# Patient Record
Sex: MALE | Race: ASIAN | NOT HISPANIC OR LATINO
[De-identification: names, ages, dates, MRNs, and addresses within clinical notes are randomized per-mention and may not be internally consistent; named-entity substitution may affect disease eponyms.]

---

## 2017-02-22 ENCOUNTER — APPOINTMENT (OUTPATIENT)
Dept: RADIOLOGY | Facility: IMAGING CENTER | Age: 72
End: 2017-02-22

## 2017-02-22 ENCOUNTER — OUTPATIENT (OUTPATIENT)
Dept: OUTPATIENT SERVICES | Facility: HOSPITAL | Age: 72
LOS: 1 days | End: 2017-02-22
Payer: MEDICARE

## 2017-02-22 DIAGNOSIS — M25.561 PAIN IN RIGHT KNEE: ICD-10-CM

## 2017-02-22 PROCEDURE — 73564 X-RAY EXAM KNEE 4 OR MORE: CPT

## 2018-05-17 ENCOUNTER — FORM ENCOUNTER (OUTPATIENT)
Age: 73
End: 2018-05-17

## 2018-05-18 ENCOUNTER — APPOINTMENT (OUTPATIENT)
Dept: ORTHOPEDIC SURGERY | Facility: CLINIC | Age: 73
End: 2018-05-18
Payer: MEDICARE

## 2018-05-18 ENCOUNTER — APPOINTMENT (OUTPATIENT)
Dept: MRI IMAGING | Facility: CLINIC | Age: 73
End: 2018-05-18
Payer: MEDICARE

## 2018-05-18 ENCOUNTER — OUTPATIENT (OUTPATIENT)
Dept: OUTPATIENT SERVICES | Facility: HOSPITAL | Age: 73
LOS: 1 days | End: 2018-05-18
Payer: MEDICARE

## 2018-05-18 VITALS — SYSTOLIC BLOOD PRESSURE: 156 MMHG | DIASTOLIC BLOOD PRESSURE: 70 MMHG | HEART RATE: 69 BPM

## 2018-05-18 VITALS — WEIGHT: 130 LBS | BODY MASS INDEX: 22.2 KG/M2 | HEIGHT: 64 IN

## 2018-05-18 DIAGNOSIS — M48.061 SPINAL STENOSIS, LUMBAR REGION WITHOUT NEUROGENIC CLAUDICATION: ICD-10-CM

## 2018-05-18 DIAGNOSIS — M48.02 SPINAL STENOSIS, CERVICAL REGION: ICD-10-CM

## 2018-05-18 DIAGNOSIS — M54.16 RADICULOPATHY, LUMBAR REGION: ICD-10-CM

## 2018-05-18 DIAGNOSIS — M48.04 SPINAL STENOSIS, THORACIC REGION: ICD-10-CM

## 2018-05-18 PROCEDURE — 72141 MRI NECK SPINE W/O DYE: CPT | Mod: 26

## 2018-05-18 PROCEDURE — 72146 MRI CHEST SPINE W/O DYE: CPT

## 2018-05-18 PROCEDURE — 72110 X-RAY EXAM L-2 SPINE 4/>VWS: CPT

## 2018-05-18 PROCEDURE — 72148 MRI LUMBAR SPINE W/O DYE: CPT | Mod: 26

## 2018-05-18 PROCEDURE — 72141 MRI NECK SPINE W/O DYE: CPT

## 2018-05-18 PROCEDURE — 72146 MRI CHEST SPINE W/O DYE: CPT | Mod: 26

## 2018-05-18 PROCEDURE — 72148 MRI LUMBAR SPINE W/O DYE: CPT

## 2018-05-18 PROCEDURE — 99204 OFFICE O/P NEW MOD 45 MIN: CPT

## 2018-05-22 RX ORDER — ALBUTEROL SULFATE 108 UG/1
108 (90 BASE) AEROSOL, METERED RESPIRATORY (INHALATION)
Qty: 20 | Refills: 0 | Status: ACTIVE | COMMUNITY
Start: 2018-04-13

## 2018-05-22 RX ORDER — METFORMIN HYDROCHLORIDE 500 MG/1
500 TABLET, COATED ORAL
Qty: 180 | Refills: 0 | Status: DISCONTINUED | COMMUNITY
Start: 2018-04-13

## 2018-05-22 RX ORDER — ISOPROPYL ALCOHOL 0.75 G/1
SWAB TOPICAL
Qty: 100 | Refills: 0 | Status: DISCONTINUED | COMMUNITY
Start: 2017-09-15

## 2018-05-22 RX ORDER — ATORVASTATIN CALCIUM 20 MG/1
20 TABLET, FILM COATED ORAL
Qty: 90 | Refills: 0 | Status: DISCONTINUED | COMMUNITY
Start: 2018-04-13

## 2018-05-30 ENCOUNTER — APPOINTMENT (OUTPATIENT)
Dept: ORTHOPEDIC SURGERY | Facility: CLINIC | Age: 73
End: 2018-05-30
Payer: MEDICARE

## 2018-05-30 PROCEDURE — 99214 OFFICE O/P EST MOD 30 MIN: CPT

## 2018-05-30 PROCEDURE — 72082 X-RAY EXAM ENTIRE SPI 2/3 VW: CPT

## 2018-06-01 ENCOUNTER — APPOINTMENT (OUTPATIENT)
Dept: SPINE | Facility: CLINIC | Age: 73
End: 2018-06-01
Payer: MEDICARE

## 2018-06-01 VITALS
BODY MASS INDEX: 22.2 KG/M2 | DIASTOLIC BLOOD PRESSURE: 72 MMHG | RESPIRATION RATE: 15 BRPM | WEIGHT: 130 LBS | SYSTOLIC BLOOD PRESSURE: 166 MMHG | HEIGHT: 64 IN | HEART RATE: 70 BPM

## 2018-06-01 DIAGNOSIS — N40.1 BENIGN PROSTATIC HYPERPLASIA WITH LOWER URINARY TRACT SYMPMS: ICD-10-CM

## 2018-06-01 DIAGNOSIS — Z86.39 PERSONAL HISTORY OF OTHER ENDOCRINE, NUTRITIONAL AND METABOLIC DISEASE: ICD-10-CM

## 2018-06-01 PROCEDURE — 99204 OFFICE O/P NEW MOD 45 MIN: CPT

## 2018-06-01 RX ORDER — ASPIRIN 325 MG
81 TABLET ORAL
Refills: 0 | Status: COMPLETED | COMMUNITY
End: 2018-06-01

## 2018-06-01 RX ORDER — NAPROXEN 500 MG/1
TABLET ORAL
Refills: 0 | Status: COMPLETED | COMMUNITY
End: 2018-06-01

## 2018-06-01 RX ORDER — FLUTICASONE PROPIONATE 50 UG/1
50 SPRAY, METERED NASAL
Qty: 48 | Refills: 0 | Status: COMPLETED | COMMUNITY
Start: 2017-09-15 | End: 2018-06-01

## 2018-06-01 RX ORDER — ASPIRIN 81 MG
81 TABLET, DELAYED RELEASE (ENTERIC COATED) ORAL
Refills: 0 | Status: ACTIVE | COMMUNITY

## 2018-06-01 RX ORDER — TAMSULOSIN HYDROCHLORIDE 0.4 MG/1
CAPSULE ORAL
Refills: 0 | Status: ACTIVE | COMMUNITY

## 2018-06-01 RX ORDER — TAMSULOSIN HYDROCHLORIDE 0.4 MG/1
0.4 CAPSULE ORAL
Qty: 90 | Refills: 0 | Status: COMPLETED | COMMUNITY
Start: 2018-04-13 | End: 2018-06-01

## 2018-06-08 ENCOUNTER — OUTPATIENT (OUTPATIENT)
Dept: OUTPATIENT SERVICES | Facility: HOSPITAL | Age: 73
LOS: 1 days | End: 2018-06-08
Payer: MEDICARE

## 2018-06-08 VITALS
WEIGHT: 128.97 LBS | SYSTOLIC BLOOD PRESSURE: 150 MMHG | HEART RATE: 75 BPM | HEIGHT: 65 IN | RESPIRATION RATE: 18 BRPM | DIASTOLIC BLOOD PRESSURE: 70 MMHG | OXYGEN SATURATION: 98 % | TEMPERATURE: 98 F

## 2018-06-08 DIAGNOSIS — M48.04 SPINAL STENOSIS, THORACIC REGION: ICD-10-CM

## 2018-06-08 DIAGNOSIS — J44.9 CHRONIC OBSTRUCTIVE PULMONARY DISEASE, UNSPECIFIED: ICD-10-CM

## 2018-06-08 DIAGNOSIS — Z29.9 ENCOUNTER FOR PROPHYLACTIC MEASURES, UNSPECIFIED: ICD-10-CM

## 2018-06-08 DIAGNOSIS — E78.5 HYPERLIPIDEMIA, UNSPECIFIED: ICD-10-CM

## 2018-06-08 DIAGNOSIS — M48.03 SPINAL STENOSIS, CERVICOTHORACIC REGION: ICD-10-CM

## 2018-06-08 DIAGNOSIS — I10 ESSENTIAL (PRIMARY) HYPERTENSION: ICD-10-CM

## 2018-06-08 DIAGNOSIS — Z01.818 ENCOUNTER FOR OTHER PREPROCEDURAL EXAMINATION: ICD-10-CM

## 2018-06-08 DIAGNOSIS — E11.9 TYPE 2 DIABETES MELLITUS WITHOUT COMPLICATIONS: ICD-10-CM

## 2018-06-08 DIAGNOSIS — Z98.890 OTHER SPECIFIED POSTPROCEDURAL STATES: Chronic | ICD-10-CM

## 2018-06-08 LAB
ANION GAP SERPL CALC-SCNC: 14 MMOL/L — SIGNIFICANT CHANGE UP (ref 5–17)
BLD GP AB SCN SERPL QL: NEGATIVE — SIGNIFICANT CHANGE UP
BUN SERPL-MCNC: 18 MG/DL — SIGNIFICANT CHANGE UP (ref 7–23)
CALCIUM SERPL-MCNC: 9.1 MG/DL — SIGNIFICANT CHANGE UP (ref 8.4–10.5)
CHLORIDE SERPL-SCNC: 97 MMOL/L — SIGNIFICANT CHANGE UP (ref 96–108)
CO2 SERPL-SCNC: 24 MMOL/L — SIGNIFICANT CHANGE UP (ref 22–31)
CREAT SERPL-MCNC: 1.03 MG/DL — SIGNIFICANT CHANGE UP (ref 0.5–1.3)
GLUCOSE SERPL-MCNC: 119 MG/DL — HIGH (ref 70–99)
HCT VFR BLD CALC: 37.6 % — LOW (ref 39–50)
HGB BLD-MCNC: 13.1 G/DL — SIGNIFICANT CHANGE UP (ref 13–17)
MCHC RBC-ENTMCNC: 32.8 PG — SIGNIFICANT CHANGE UP (ref 27–34)
MCHC RBC-ENTMCNC: 34.8 GM/DL — SIGNIFICANT CHANGE UP (ref 32–36)
MCV RBC AUTO: 94.2 FL — SIGNIFICANT CHANGE UP (ref 80–100)
PLATELET # BLD AUTO: 234 K/UL — SIGNIFICANT CHANGE UP (ref 150–400)
POTASSIUM SERPL-MCNC: 4.3 MMOL/L — SIGNIFICANT CHANGE UP (ref 3.5–5.3)
POTASSIUM SERPL-SCNC: 4.3 MMOL/L — SIGNIFICANT CHANGE UP (ref 3.5–5.3)
RBC # BLD: 3.99 M/UL — LOW (ref 4.2–5.8)
RBC # FLD: 13.1 % — SIGNIFICANT CHANGE UP (ref 10.3–14.5)
RH IG SCN BLD-IMP: POSITIVE — SIGNIFICANT CHANGE UP
SODIUM SERPL-SCNC: 135 MMOL/L — SIGNIFICANT CHANGE UP (ref 135–145)
WBC # BLD: 8.09 K/UL — SIGNIFICANT CHANGE UP (ref 3.8–10.5)
WBC # FLD AUTO: 8.09 K/UL — SIGNIFICANT CHANGE UP (ref 3.8–10.5)

## 2018-06-08 PROCEDURE — 80048 BASIC METABOLIC PNL TOTAL CA: CPT

## 2018-06-08 PROCEDURE — 86900 BLOOD TYPING SEROLOGIC ABO: CPT

## 2018-06-08 PROCEDURE — 87640 STAPH A DNA AMP PROBE: CPT

## 2018-06-08 PROCEDURE — G0463: CPT

## 2018-06-08 PROCEDURE — 87641 MR-STAPH DNA AMP PROBE: CPT

## 2018-06-08 PROCEDURE — 83036 HEMOGLOBIN GLYCOSYLATED A1C: CPT

## 2018-06-08 PROCEDURE — 85027 COMPLETE CBC AUTOMATED: CPT

## 2018-06-08 PROCEDURE — 86901 BLOOD TYPING SEROLOGIC RH(D): CPT

## 2018-06-08 PROCEDURE — 86850 RBC ANTIBODY SCREEN: CPT

## 2018-06-08 RX ORDER — SODIUM CHLORIDE 9 MG/ML
3 INJECTION INTRAMUSCULAR; INTRAVENOUS; SUBCUTANEOUS EVERY 8 HOURS
Qty: 0 | Refills: 0 | Status: DISCONTINUED | OUTPATIENT
Start: 2018-06-15 | End: 2018-06-15

## 2018-06-08 RX ORDER — CEFAZOLIN SODIUM 1 G
2000 VIAL (EA) INJECTION ONCE
Qty: 0 | Refills: 0 | Status: DISCONTINUED | OUTPATIENT
Start: 2018-06-15 | End: 2018-06-16

## 2018-06-08 RX ORDER — LIDOCAINE HCL 20 MG/ML
0.2 VIAL (ML) INJECTION ONCE
Qty: 0 | Refills: 0 | Status: DISCONTINUED | OUTPATIENT
Start: 2018-06-15 | End: 2018-06-18

## 2018-06-08 NOTE — H&P PST ADULT - PROBLEM SELECTOR PLAN 1
T10-T12 and L4-L5  decompressive Lumbar Laminectomy Insitu Fusion on 6/15/2018  -pre- op instructions discussed  -CBC,BMP,Hga1c,MRSA/MSSA Type and screen sent -Pre- op instructions discussed   -CBC,BMP,Type nd Screen, MRSA/MSSA sent T10-T12 and L4-L5  decompressive Lumbar Laminectomy Insitu Fusion on 6/15/2018.  -Pre- op instructions discussed   -CBC,BMP,Type nd Screen, MRSA/MSSA sent  - pt will stop aspirin for 5 days his own

## 2018-06-08 NOTE — H&P PST ADULT - RS GEN PE MLT RESP DETAILS PC
breath sounds equal/respirations non-labored/clear to auscultation bilaterally/normal/good air movement/airway patent

## 2018-06-08 NOTE — H&P PST ADULT - ASSESSMENT
CAPRINI SCORE [CLOT]    AGE RELATED RISK FACTORS                                                       MOBILITY RELATED FACTORS  [ ] Age 41-60 years                                            (1 Point)                  [ ] Bed rest                                                        (1 Point)  [ ] Age: 61-74 years                                           (2 Points)                 [ ] Plaster cast                                                   (2 Points)  [x ] Age= 75 years                                              (3 Points)                 [ ] Bed bound for more than 72 hours                 (2 Points)    DISEASE RELATED RISK FACTORS                                               GENDER SPECIFIC FACTORS  [ ] Edema in the lower extremities                       (1 Point)                  [ ] Pregnancy                                                     (1 Point)  [ ] Varicose veins                                               (1 Point)                  [ ] Post-partum < 6 weeks                                   (1 Point)             [ ] BMI > 25 Kg/m2                                            (1 Point)                  [ ] Hormonal therapy  or oral contraception          (1 Point)                 [ ] Sepsis (in the previous month)                        (1 Point)                  [ ] History of pregnancy complications                 (1 point)  [ ] Pneumonia or serious lung disease                                               [ ] Unexplained or recurrent                     (1 Point)           (in the previous month)                               (1 Point)  [ ] Abnormal pulmonary function test                     (1 Point)                 SURGERY RELATED RISK FACTORS  [ ] Acute myocardial infarction                              (1 Point)                 [ ]  Section                                             (1 Point)  [ ] Congestive heart failure (in the previous month)  (1 Point)               [ ] Minor surgery                                                  (1 Point)   [ ] Inflammatory bowel disease                             (1 Point)                 [ ] Arthroscopic surgery                                        (2 Points)  [ ] Central venous access                                      (2 Points)                [x ] General surgery lasting more than 45 minutes   (2 Points)       [ ] Stroke (in the previous month)                          (5 Points)               [ ] Elective arthroplasty                                         (5 Points)                                                                                                                                               HEMATOLOGY RELATED FACTORS                                                 TRAUMA RELATED RISK FACTORS  [ ] Prior episodes of VTE                                     (3 Points)                 [ ] Fracture of the hip, pelvis, or leg                       (5 Points)  [ ] Positive family history for VTE                         (3 Points)                 [ ] Acute spinal cord injury (in the previous month)  (5 Points)  [ ] Prothrombin 85707 A                                     (3 Points)                 [ ] Paralysis  (less than 1 month)                             (5 Points)  [ ] Factor V Leiden                                             (3 Points)                  [ ] Multiple Trauma within 1 month                        (5 Points)  [ ] Lupus anticoagulants                                     (3 Points)                                                           [ ] Anticardiolipin antibodies                               (3 Points)                                                       [ ] High homocysteine in the blood                      (3 Points)                                             [ ] Other congenital or acquired thrombophilia      (3 Points)                                                [ ] Heparin induced thrombocytopenia                  (3 Points)                                          Total Score [        6  ]

## 2018-06-08 NOTE — H&P PST ADULT - PMH
Anemia    Chronic back pain    COPD (chronic obstructive pulmonary disease)  last exacerbation 2 years ago - no h/o hospitalization  DM type 2 (diabetes mellitus, type 2)    HLD (hyperlipidemia)    HTN (hypertension)    Spinal stenosis of cervicothoracic region  and Lumbar region

## 2018-06-08 NOTE — H&P PST ADULT - PROBLEM SELECTOR PLAN 6
The Caprini score indicates that this patient is at high risk for a VTE event (score = 6).    Ssurgical patients in this group will benefit from both pharmacologic prophylaxis and intermittent compression devices.    The surgical team will determine the balance between VTE risk and bleeding risk, and other clinical considerations.

## 2018-06-08 NOTE — H&P PST ADULT - HISTORY OF PRESENT ILLNESS
74 y/o male  with PMH of HTN, HLD, DM type 2 , chronic back pain .  Pt has been experiencing  lower back pain , radiating to both legs with numbness and tingling sensation difficulty  walking for few years  also c/o numbness and tinging sensation to UE  followed by neurology, MRI of cervical, thoracic and lumbar  revealed T10-T12 myelomalacia secondary to stenosis , also noted L4-5 stenosis . Pt tried multiple conservative management in the past without any improvement . Presents to PST for scheduled T10-T12 and L4-L5  decompressive Lumbar Laminectomy Insitu Fusion on 6/15/2018.

## 2018-06-08 NOTE — H&P PST ADULT - NSANTHOSAYNRD_GEN_A_CORE
15.25 inch neck/No. CORBIN screening performed.  STOP BANG Legend: 0-2 = LOW Risk; 3-4 = INTERMEDIATE Risk; 5-8 = HIGH Risk criteria/No. CORBIN screening performed.  STOP BANG Legend: 0-2 = LOW Risk; 3-4 = INTERMEDIATE Risk; 5-8 = HIGH Risk

## 2018-06-08 NOTE — H&P PST ADULT - MALLAMPATI CLASS
Class II - visualization of the soft palate, fauces, and uvula/15.25 inch enck Class II - visualization of the soft palate, fauces, and uvula/15.25 inch neck

## 2018-06-09 LAB
HBA1C BLD-MCNC: 5.8 % — HIGH (ref 4–5.6)
MRSA PCR RESULT.: SIGNIFICANT CHANGE UP
S AUREUS DNA NOSE QL NAA+PROBE: SIGNIFICANT CHANGE UP

## 2018-06-14 ENCOUNTER — TRANSCRIPTION ENCOUNTER (OUTPATIENT)
Age: 73
End: 2018-06-14

## 2018-06-15 ENCOUNTER — APPOINTMENT (OUTPATIENT)
Dept: SPINE | Facility: HOSPITAL | Age: 73
End: 2018-06-15

## 2018-06-15 ENCOUNTER — INPATIENT (INPATIENT)
Facility: HOSPITAL | Age: 73
LOS: 2 days | Discharge: HOME | DRG: 519 | End: 2018-06-18
Attending: NEUROLOGICAL SURGERY | Admitting: NEUROLOGICAL SURGERY
Payer: MEDICARE

## 2018-06-15 VITALS
DIASTOLIC BLOOD PRESSURE: 74 MMHG | HEIGHT: 65 IN | WEIGHT: 128.97 LBS | HEART RATE: 66 BPM | TEMPERATURE: 98 F | RESPIRATION RATE: 16 BRPM | OXYGEN SATURATION: 98 % | SYSTOLIC BLOOD PRESSURE: 154 MMHG

## 2018-06-15 DIAGNOSIS — M48.04 SPINAL STENOSIS, THORACIC REGION: ICD-10-CM

## 2018-06-15 DIAGNOSIS — Z98.890 OTHER SPECIFIED POSTPROCEDURAL STATES: Chronic | ICD-10-CM

## 2018-06-15 LAB
ALBUMIN SERPL ELPH-MCNC: 3.6 G/DL — SIGNIFICANT CHANGE UP (ref 3.3–5)
ALP SERPL-CCNC: 36 U/L — LOW (ref 40–120)
ALT FLD-CCNC: 21 U/L — SIGNIFICANT CHANGE UP (ref 10–45)
ANION GAP SERPL CALC-SCNC: 11 MMOL/L — SIGNIFICANT CHANGE UP (ref 5–17)
AST SERPL-CCNC: 19 U/L — SIGNIFICANT CHANGE UP (ref 10–40)
BASOPHILS # BLD AUTO: 0.1 K/UL — SIGNIFICANT CHANGE UP (ref 0–0.2)
BASOPHILS NFR BLD AUTO: 0.8 % — SIGNIFICANT CHANGE UP (ref 0–2)
BILIRUB SERPL-MCNC: 0.2 MG/DL — SIGNIFICANT CHANGE UP (ref 0.2–1.2)
BUN SERPL-MCNC: 15 MG/DL — SIGNIFICANT CHANGE UP (ref 7–23)
CALCIUM SERPL-MCNC: 7.8 MG/DL — LOW (ref 8.4–10.5)
CHLORIDE SERPL-SCNC: 104 MMOL/L — SIGNIFICANT CHANGE UP (ref 96–108)
CO2 SERPL-SCNC: 24 MMOL/L — SIGNIFICANT CHANGE UP (ref 22–31)
CREAT SERPL-MCNC: 0.91 MG/DL — SIGNIFICANT CHANGE UP (ref 0.5–1.3)
EOSINOPHIL # BLD AUTO: 0 K/UL — SIGNIFICANT CHANGE UP (ref 0–0.5)
EOSINOPHIL NFR BLD AUTO: 0.5 % — SIGNIFICANT CHANGE UP (ref 0–6)
GLUCOSE BLDC GLUCOMTR-MCNC: 130 MG/DL — HIGH (ref 70–99)
GLUCOSE BLDC GLUCOMTR-MCNC: 152 MG/DL — HIGH (ref 70–99)
GLUCOSE BLDC GLUCOMTR-MCNC: 160 MG/DL — HIGH (ref 70–99)
GLUCOSE SERPL-MCNC: 199 MG/DL — HIGH (ref 70–99)
HCT VFR BLD CALC: 33.9 % — LOW (ref 39–50)
HGB BLD-MCNC: 11.9 G/DL — LOW (ref 13–17)
LYMPHOCYTES # BLD AUTO: 0.9 K/UL — LOW (ref 1–3.3)
LYMPHOCYTES # BLD AUTO: 11.4 % — LOW (ref 13–44)
MCHC RBC-ENTMCNC: 34.3 PG — HIGH (ref 27–34)
MCHC RBC-ENTMCNC: 35.2 GM/DL — SIGNIFICANT CHANGE UP (ref 32–36)
MCV RBC AUTO: 97.5 FL — SIGNIFICANT CHANGE UP (ref 80–100)
MONOCYTES # BLD AUTO: 0.1 K/UL — SIGNIFICANT CHANGE UP (ref 0–0.9)
MONOCYTES NFR BLD AUTO: 1.6 % — LOW (ref 2–14)
NEUTROPHILS # BLD AUTO: 6.8 K/UL — SIGNIFICANT CHANGE UP (ref 1.8–7.4)
NEUTROPHILS NFR BLD AUTO: 85.8 % — HIGH (ref 43–77)
PLATELET # BLD AUTO: 195 K/UL — SIGNIFICANT CHANGE UP (ref 150–400)
POTASSIUM SERPL-MCNC: 4.2 MMOL/L — SIGNIFICANT CHANGE UP (ref 3.5–5.3)
POTASSIUM SERPL-SCNC: 4.2 MMOL/L — SIGNIFICANT CHANGE UP (ref 3.5–5.3)
PROT SERPL-MCNC: 6.5 G/DL — SIGNIFICANT CHANGE UP (ref 6–8.3)
RBC # BLD: 3.48 M/UL — LOW (ref 4.2–5.8)
RBC # FLD: 11.2 % — SIGNIFICANT CHANGE UP (ref 10.3–14.5)
RH IG SCN BLD-IMP: POSITIVE — SIGNIFICANT CHANGE UP
SODIUM SERPL-SCNC: 139 MMOL/L — SIGNIFICANT CHANGE UP (ref 135–145)
WBC # BLD: 8 K/UL — SIGNIFICANT CHANGE UP (ref 3.8–10.5)
WBC # FLD AUTO: 8 K/UL — SIGNIFICANT CHANGE UP (ref 3.8–10.5)

## 2018-06-15 PROCEDURE — 63047 LAM FACETEC & FORAMOT LUMBAR: CPT | Mod: 59,GC

## 2018-06-15 PROCEDURE — 72020 X-RAY EXAM OF SPINE 1 VIEW: CPT | Mod: 26

## 2018-06-15 PROCEDURE — 63048 LAM FACETEC &FORAMOT EA ADDL: CPT | Mod: GC

## 2018-06-15 PROCEDURE — 63046 LAM FACETEC & FORAMOT THRC: CPT | Mod: GC

## 2018-06-15 RX ORDER — ATORVASTATIN CALCIUM 80 MG/1
20 TABLET, FILM COATED ORAL AT BEDTIME
Qty: 0 | Refills: 0 | Status: DISCONTINUED | OUTPATIENT
Start: 2018-06-15 | End: 2018-06-18

## 2018-06-15 RX ORDER — DEXTROSE 50 % IN WATER 50 %
15 SYRINGE (ML) INTRAVENOUS ONCE
Qty: 0 | Refills: 0 | Status: DISCONTINUED | OUTPATIENT
Start: 2018-06-15 | End: 2018-06-18

## 2018-06-15 RX ORDER — DEXTROSE MONOHYDRATE, SODIUM CHLORIDE, AND POTASSIUM CHLORIDE 50; .745; 4.5 G/1000ML; G/1000ML; G/1000ML
1000 INJECTION, SOLUTION INTRAVENOUS
Qty: 0 | Refills: 0 | Status: DISCONTINUED | OUTPATIENT
Start: 2018-06-15 | End: 2018-06-18

## 2018-06-15 RX ORDER — TAMSULOSIN HYDROCHLORIDE 0.4 MG/1
0.4 CAPSULE ORAL AT BEDTIME
Qty: 0 | Refills: 0 | Status: DISCONTINUED | OUTPATIENT
Start: 2018-06-15 | End: 2018-06-18

## 2018-06-15 RX ORDER — ATORVASTATIN CALCIUM 80 MG/1
1 TABLET, FILM COATED ORAL
Qty: 0 | Refills: 0 | COMMUNITY

## 2018-06-15 RX ORDER — DEXTROSE 50 % IN WATER 50 %
25 SYRINGE (ML) INTRAVENOUS ONCE
Qty: 0 | Refills: 0 | Status: DISCONTINUED | OUTPATIENT
Start: 2018-06-15 | End: 2018-06-18

## 2018-06-15 RX ORDER — DOCUSATE SODIUM 100 MG
100 CAPSULE ORAL THREE TIMES A DAY
Qty: 0 | Refills: 0 | Status: DISCONTINUED | OUTPATIENT
Start: 2018-06-15 | End: 2018-06-18

## 2018-06-15 RX ORDER — LEVOTHYROXINE SODIUM 125 MCG
75 TABLET ORAL DAILY
Qty: 0 | Refills: 0 | Status: DISCONTINUED | OUTPATIENT
Start: 2018-06-15 | End: 2018-06-18

## 2018-06-15 RX ORDER — LEVOTHYROXINE SODIUM 125 MCG
1 TABLET ORAL
Qty: 0 | Refills: 0 | COMMUNITY

## 2018-06-15 RX ORDER — DEXTROSE 50 % IN WATER 50 %
12.5 SYRINGE (ML) INTRAVENOUS ONCE
Qty: 0 | Refills: 0 | Status: DISCONTINUED | OUTPATIENT
Start: 2018-06-15 | End: 2018-06-18

## 2018-06-15 RX ORDER — PANTOPRAZOLE SODIUM 20 MG/1
40 TABLET, DELAYED RELEASE ORAL DAILY
Qty: 0 | Refills: 0 | Status: DISCONTINUED | OUTPATIENT
Start: 2018-06-15 | End: 2018-06-18

## 2018-06-15 RX ORDER — LISINOPRIL 2.5 MG/1
40 TABLET ORAL DAILY
Qty: 0 | Refills: 0 | Status: DISCONTINUED | OUTPATIENT
Start: 2018-06-15 | End: 2018-06-18

## 2018-06-15 RX ORDER — NALOXONE HYDROCHLORIDE 4 MG/.1ML
0.1 SPRAY NASAL
Qty: 0 | Refills: 0 | Status: DISCONTINUED | OUTPATIENT
Start: 2018-06-15 | End: 2018-06-18

## 2018-06-15 RX ORDER — ACETAMINOPHEN 500 MG
650 TABLET ORAL EVERY 6 HOURS
Qty: 0 | Refills: 0 | Status: DISCONTINUED | OUTPATIENT
Start: 2018-06-15 | End: 2018-06-18

## 2018-06-15 RX ORDER — GLUCAGON INJECTION, SOLUTION 0.5 MG/.1ML
1 INJECTION, SOLUTION SUBCUTANEOUS ONCE
Qty: 0 | Refills: 0 | Status: DISCONTINUED | OUTPATIENT
Start: 2018-06-15 | End: 2018-06-18

## 2018-06-15 RX ORDER — FLUTICASONE PROPIONATE AND SALMETEROL 50; 250 UG/1; UG/1
1 POWDER ORAL; RESPIRATORY (INHALATION)
Qty: 0 | Refills: 0 | COMMUNITY

## 2018-06-15 RX ORDER — INSULIN LISPRO 100/ML
VIAL (ML) SUBCUTANEOUS
Qty: 0 | Refills: 0 | Status: DISCONTINUED | OUTPATIENT
Start: 2018-06-15 | End: 2018-06-16

## 2018-06-15 RX ORDER — ONDANSETRON 8 MG/1
4 TABLET, FILM COATED ORAL ONCE
Qty: 0 | Refills: 0 | Status: DISCONTINUED | OUTPATIENT
Start: 2018-06-15 | End: 2018-06-15

## 2018-06-15 RX ORDER — RAMIPRIL 5 MG
1 CAPSULE ORAL
Qty: 0 | Refills: 0 | COMMUNITY

## 2018-06-15 RX ORDER — ONDANSETRON 8 MG/1
4 TABLET, FILM COATED ORAL EVERY 6 HOURS
Qty: 0 | Refills: 0 | Status: DISCONTINUED | OUTPATIENT
Start: 2018-06-15 | End: 2018-06-18

## 2018-06-15 RX ORDER — METFORMIN HYDROCHLORIDE 850 MG/1
1 TABLET ORAL
Qty: 0 | Refills: 0 | COMMUNITY

## 2018-06-15 RX ORDER — SENNA PLUS 8.6 MG/1
2 TABLET ORAL AT BEDTIME
Qty: 0 | Refills: 0 | Status: DISCONTINUED | OUTPATIENT
Start: 2018-06-15 | End: 2018-06-16

## 2018-06-15 RX ORDER — ONDANSETRON 8 MG/1
4 TABLET, FILM COATED ORAL EVERY 6 HOURS
Qty: 0 | Refills: 0 | Status: DISCONTINUED | OUTPATIENT
Start: 2018-06-15 | End: 2018-06-17

## 2018-06-15 RX ORDER — ALBUTEROL 90 UG/1
2 AEROSOL, METERED ORAL
Qty: 0 | Refills: 0 | COMMUNITY

## 2018-06-15 RX ORDER — MONTELUKAST 4 MG/1
10 TABLET, CHEWABLE ORAL DAILY
Qty: 0 | Refills: 0 | Status: DISCONTINUED | OUTPATIENT
Start: 2018-06-15 | End: 2018-06-18

## 2018-06-15 RX ORDER — HYDROMORPHONE HYDROCHLORIDE 2 MG/ML
30 INJECTION INTRAMUSCULAR; INTRAVENOUS; SUBCUTANEOUS
Qty: 0 | Refills: 0 | Status: DISCONTINUED | OUTPATIENT
Start: 2018-06-15 | End: 2018-06-17

## 2018-06-15 RX ORDER — ALBUTEROL 90 UG/1
2 AEROSOL, METERED ORAL EVERY 6 HOURS
Qty: 0 | Refills: 0 | Status: DISCONTINUED | OUTPATIENT
Start: 2018-06-15 | End: 2018-06-18

## 2018-06-15 RX ORDER — TAMSULOSIN HYDROCHLORIDE 0.4 MG/1
1 CAPSULE ORAL
Qty: 0 | Refills: 0 | COMMUNITY

## 2018-06-15 RX ORDER — CEFAZOLIN SODIUM 1 G
1000 VIAL (EA) INJECTION EVERY 8 HOURS
Qty: 0 | Refills: 0 | Status: COMPLETED | OUTPATIENT
Start: 2018-06-15 | End: 2018-06-16

## 2018-06-15 RX ORDER — INSULIN LISPRO 100/ML
VIAL (ML) SUBCUTANEOUS
Qty: 0 | Refills: 0 | Status: DISCONTINUED | OUTPATIENT
Start: 2018-06-15 | End: 2018-06-18

## 2018-06-15 RX ORDER — HYDROMORPHONE HYDROCHLORIDE 2 MG/ML
0.25 INJECTION INTRAMUSCULAR; INTRAVENOUS; SUBCUTANEOUS
Qty: 0 | Refills: 0 | Status: DISCONTINUED | OUTPATIENT
Start: 2018-06-15 | End: 2018-06-15

## 2018-06-15 RX ORDER — SODIUM CHLORIDE 9 MG/ML
1000 INJECTION, SOLUTION INTRAVENOUS
Qty: 0 | Refills: 0 | Status: DISCONTINUED | OUTPATIENT
Start: 2018-06-15 | End: 2018-06-18

## 2018-06-15 RX ADMIN — ATORVASTATIN CALCIUM 20 MILLIGRAM(S): 80 TABLET, FILM COATED ORAL at 21:49

## 2018-06-15 RX ADMIN — DEXTROSE MONOHYDRATE, SODIUM CHLORIDE, AND POTASSIUM CHLORIDE 75 MILLILITER(S): 50; .745; 4.5 INJECTION, SOLUTION INTRAVENOUS at 20:25

## 2018-06-15 RX ADMIN — HYDROMORPHONE HYDROCHLORIDE 0.25 MILLIGRAM(S): 2 INJECTION INTRAMUSCULAR; INTRAVENOUS; SUBCUTANEOUS at 11:25

## 2018-06-15 RX ADMIN — HYDROMORPHONE HYDROCHLORIDE 30 MILLILITER(S): 2 INJECTION INTRAMUSCULAR; INTRAVENOUS; SUBCUTANEOUS at 11:37

## 2018-06-15 RX ADMIN — HYDROMORPHONE HYDROCHLORIDE 0.25 MILLIGRAM(S): 2 INJECTION INTRAMUSCULAR; INTRAVENOUS; SUBCUTANEOUS at 20:06

## 2018-06-15 RX ADMIN — DEXTROSE MONOHYDRATE, SODIUM CHLORIDE, AND POTASSIUM CHLORIDE 75 MILLILITER(S): 50; .745; 4.5 INJECTION, SOLUTION INTRAVENOUS at 21:50

## 2018-06-15 RX ADMIN — Medication 100 MILLIGRAM(S): at 16:57

## 2018-06-15 RX ADMIN — TAMSULOSIN HYDROCHLORIDE 0.4 MILLIGRAM(S): 0.4 CAPSULE ORAL at 21:49

## 2018-06-15 RX ADMIN — HYDROMORPHONE HYDROCHLORIDE 30 MILLILITER(S): 2 INJECTION INTRAMUSCULAR; INTRAVENOUS; SUBCUTANEOUS at 21:47

## 2018-06-15 RX ADMIN — HYDROMORPHONE HYDROCHLORIDE 30 MILLILITER(S): 2 INJECTION INTRAMUSCULAR; INTRAVENOUS; SUBCUTANEOUS at 20:03

## 2018-06-15 RX ADMIN — Medication 100 MILLIGRAM(S): at 21:49

## 2018-06-15 RX ADMIN — SODIUM CHLORIDE 3 MILLILITER(S): 9 INJECTION INTRAMUSCULAR; INTRAVENOUS; SUBCUTANEOUS at 06:36

## 2018-06-15 RX ADMIN — HYDROMORPHONE HYDROCHLORIDE 30 MILLILITER(S): 2 INJECTION INTRAMUSCULAR; INTRAVENOUS; SUBCUTANEOUS at 22:54

## 2018-06-15 RX ADMIN — PANTOPRAZOLE SODIUM 40 MILLIGRAM(S): 20 TABLET, DELAYED RELEASE ORAL at 20:02

## 2018-06-15 NOTE — PHYSICAL THERAPY INITIAL EVALUATION ADULT - PRECAUTIONS/LIMITATIONS, REHAB EVAL
Pt tried multiple conservative management in the past without any improvement. Pt is s/p T10-12 laminectomy, L4 laminectomy

## 2018-06-15 NOTE — PHYSICAL THERAPY INITIAL EVALUATION ADULT - ADDITIONAL COMMENTS
Pt lives with spouse in a private house with bedroom/bathroom on the main level. Pt was Ind with all ADLs and amb with a SC.

## 2018-06-15 NOTE — PHYSICAL THERAPY INITIAL EVALUATION ADULT - PERTINENT HX OF CURRENT PROBLEM, REHAB EVAL
Pt is a 74 y/o male admitted to [facility] on 6/15/18 PMH of HTN, HLD, DM type 2 , chronic back pain .  Pt has been experiencing  lower back pain , radiating to both legs with numbness and tingling sensation difficulty  walking for few years  also c/o numbness and tinging sensation to UE  followed by neurology, MRI of cervical, thoracic and lumbar  revealed T10-T12 myelomalacia secondary to stenosis , also noted L4-5 stenosis.

## 2018-06-15 NOTE — PACU DISCHARGE NOTE - AIRWAY PATENCY:
Pt present to ed from home with c/o feeling tired and fatigue, pt also noted from last blood work that showed abn hgb, plt ct. Satisfactory

## 2018-06-16 LAB
ANION GAP SERPL CALC-SCNC: 9 MMOL/L — SIGNIFICANT CHANGE UP (ref 5–17)
BUN SERPL-MCNC: 18 MG/DL — SIGNIFICANT CHANGE UP (ref 7–23)
CALCIUM SERPL-MCNC: 8.5 MG/DL — SIGNIFICANT CHANGE UP (ref 8.4–10.5)
CHLORIDE SERPL-SCNC: 103 MMOL/L — SIGNIFICANT CHANGE UP (ref 96–108)
CO2 SERPL-SCNC: 26 MMOL/L — SIGNIFICANT CHANGE UP (ref 22–31)
CREAT SERPL-MCNC: 0.99 MG/DL — SIGNIFICANT CHANGE UP (ref 0.5–1.3)
GLUCOSE BLDC GLUCOMTR-MCNC: 115 MG/DL — HIGH (ref 70–99)
GLUCOSE BLDC GLUCOMTR-MCNC: 145 MG/DL — HIGH (ref 70–99)
GLUCOSE BLDC GLUCOMTR-MCNC: 153 MG/DL — HIGH (ref 70–99)
GLUCOSE BLDC GLUCOMTR-MCNC: 191 MG/DL — HIGH (ref 70–99)
GLUCOSE SERPL-MCNC: 138 MG/DL — HIGH (ref 70–99)
HCT VFR BLD CALC: 35.7 % — LOW (ref 39–50)
HGB BLD-MCNC: 12.2 G/DL — LOW (ref 13–17)
MCHC RBC-ENTMCNC: 33.6 PG — SIGNIFICANT CHANGE UP (ref 27–34)
MCHC RBC-ENTMCNC: 34.3 GM/DL — SIGNIFICANT CHANGE UP (ref 32–36)
MCV RBC AUTO: 98 FL — SIGNIFICANT CHANGE UP (ref 80–100)
PLATELET # BLD AUTO: 185 K/UL — SIGNIFICANT CHANGE UP (ref 150–400)
POTASSIUM SERPL-MCNC: 4.4 MMOL/L — SIGNIFICANT CHANGE UP (ref 3.5–5.3)
POTASSIUM SERPL-SCNC: 4.4 MMOL/L — SIGNIFICANT CHANGE UP (ref 3.5–5.3)
RBC # BLD: 3.65 M/UL — LOW (ref 4.2–5.8)
RBC # FLD: 11.4 % — SIGNIFICANT CHANGE UP (ref 10.3–14.5)
SODIUM SERPL-SCNC: 138 MMOL/L — SIGNIFICANT CHANGE UP (ref 135–145)
WBC # BLD: 11.8 K/UL — HIGH (ref 3.8–10.5)
WBC # FLD AUTO: 11.8 K/UL — HIGH (ref 3.8–10.5)

## 2018-06-16 RX ORDER — INSULIN LISPRO 100/ML
VIAL (ML) SUBCUTANEOUS AT BEDTIME
Qty: 0 | Refills: 0 | Status: DISCONTINUED | OUTPATIENT
Start: 2018-06-16 | End: 2018-06-18

## 2018-06-16 RX ORDER — SENNA PLUS 8.6 MG/1
2 TABLET ORAL AT BEDTIME
Qty: 0 | Refills: 0 | Status: DISCONTINUED | OUTPATIENT
Start: 2018-06-16 | End: 2018-06-18

## 2018-06-16 RX ORDER — ENOXAPARIN SODIUM 100 MG/ML
40 INJECTION SUBCUTANEOUS AT BEDTIME
Qty: 0 | Refills: 0 | Status: DISCONTINUED | OUTPATIENT
Start: 2018-06-16 | End: 2018-06-18

## 2018-06-16 RX ADMIN — Medication 100 MILLIGRAM(S): at 07:10

## 2018-06-16 RX ADMIN — HYDROMORPHONE HYDROCHLORIDE 30 MILLILITER(S): 2 INJECTION INTRAMUSCULAR; INTRAVENOUS; SUBCUTANEOUS at 07:08

## 2018-06-16 RX ADMIN — HYDROMORPHONE HYDROCHLORIDE 30 MILLILITER(S): 2 INJECTION INTRAMUSCULAR; INTRAVENOUS; SUBCUTANEOUS at 18:01

## 2018-06-16 RX ADMIN — TAMSULOSIN HYDROCHLORIDE 0.4 MILLIGRAM(S): 0.4 CAPSULE ORAL at 22:03

## 2018-06-16 RX ADMIN — HYDROMORPHONE HYDROCHLORIDE 30 MILLILITER(S): 2 INJECTION INTRAMUSCULAR; INTRAVENOUS; SUBCUTANEOUS at 01:48

## 2018-06-16 RX ADMIN — HYDROMORPHONE HYDROCHLORIDE 30 MILLILITER(S): 2 INJECTION INTRAMUSCULAR; INTRAVENOUS; SUBCUTANEOUS at 22:09

## 2018-06-16 RX ADMIN — LISINOPRIL 40 MILLIGRAM(S): 2.5 TABLET ORAL at 07:06

## 2018-06-16 RX ADMIN — PANTOPRAZOLE SODIUM 40 MILLIGRAM(S): 20 TABLET, DELAYED RELEASE ORAL at 11:31

## 2018-06-16 RX ADMIN — HYDROMORPHONE HYDROCHLORIDE 30 MILLILITER(S): 2 INJECTION INTRAMUSCULAR; INTRAVENOUS; SUBCUTANEOUS at 19:37

## 2018-06-16 RX ADMIN — ATORVASTATIN CALCIUM 20 MILLIGRAM(S): 80 TABLET, FILM COATED ORAL at 22:03

## 2018-06-16 RX ADMIN — Medication 75 MICROGRAM(S): at 07:06

## 2018-06-16 RX ADMIN — MONTELUKAST 10 MILLIGRAM(S): 4 TABLET, CHEWABLE ORAL at 11:31

## 2018-06-16 RX ADMIN — HYDROMORPHONE HYDROCHLORIDE 30 MILLILITER(S): 2 INJECTION INTRAMUSCULAR; INTRAVENOUS; SUBCUTANEOUS at 20:13

## 2018-06-16 RX ADMIN — DEXTROSE MONOHYDRATE, SODIUM CHLORIDE, AND POTASSIUM CHLORIDE 75 MILLILITER(S): 50; .745; 4.5 INJECTION, SOLUTION INTRAVENOUS at 22:08

## 2018-06-16 RX ADMIN — HYDROMORPHONE HYDROCHLORIDE 30 MILLILITER(S): 2 INJECTION INTRAMUSCULAR; INTRAVENOUS; SUBCUTANEOUS at 10:40

## 2018-06-16 RX ADMIN — ENOXAPARIN SODIUM 40 MILLIGRAM(S): 100 INJECTION SUBCUTANEOUS at 22:02

## 2018-06-16 RX ADMIN — HYDROMORPHONE HYDROCHLORIDE 30 MILLILITER(S): 2 INJECTION INTRAMUSCULAR; INTRAVENOUS; SUBCUTANEOUS at 14:19

## 2018-06-16 RX ADMIN — Medication: at 10:29

## 2018-06-16 NOTE — PROGRESS NOTE ADULT - ASSESSMENT
This patient is a 73-year old man with cervical stenosis who is postop day one from T10-T12 and L4 laminectomies for symptomatic spinal stenosis. His pain is well controlled on the PCA.  -Will d/c liang today  -Will continue PCA until tomorrow morning. Discussed w/ patient that we will then transition him to oral pain medication  -Continue hemovac for now  -Lovenox tonight  -Venodynes  -IS  -CCD and ISS

## 2018-06-16 NOTE — PROGRESS NOTE ADULT - SUBJECTIVE AND OBJECTIVE BOX
SUBJECTIVE:   Patient overall feels well. Only complaint is back pain. Radicular pain is gone.   Asking to keep PCA as his pain is very well controlled.     OVERNIGHT EVENTS:     Vital Signs Last 24 Hrs  T(C): 36.6 (16 Jun 2018 08:36), Max: 37 (15 Fred 2018 20:00)  T(F): 97.9 (16 Jun 2018 08:36), Max: 98.6 (15 Fred 2018 20:00)  HR: 78 (16 Jun 2018 08:36) (67 - 90)  BP: 151/66 (16 Jun 2018 08:36) (122/66 - 160/79)  BP(mean): 109 (15 Fred 2018 21:00) (100 - 110)  RR: 16 (16 Jun 2018 08:36) (14 - 18)  SpO2: 97% (16 Jun 2018 08:36) (95% - 100%)    DRAINS: hemovac out 150 cc in 24 hours     PHYSICAL EXAM:    Constitutional: No Acute Distress     Neurological: AOx3, Following Commands, Moving all Extremities     Motor exam:          Upper extremity                         Delt     Bicep     Tricep    HG                                                 R         5/5        5/5        5/5       5/5                                               L          5/5        5/5        5/5       5/5          Lower extremity                        HF         KF        KE       DF         PF                                                  R        5/5        5/5        5/5       5/5         5/5                                               L     4++/5        5/5       5/5       5/5          5/5                                                 Sensation: [x] intact to light touch  [] decreased:     Extremities: No calf tenderness     Incision: dressing clean and dry     LABS:                        12.2   11.8  )-----------( 185      ( 16 Jun 2018 06:02 )             35.7    06-16    138  |  103  |  18  ----------------------------<  138<H>  4.4   |  26  |  0.99    Ca    8.5      16 Jun 2018 06:02    TPro  6.5  /  Alb  3.6  /  TBili  0.2  /  DBili  x   /  AST  19  /  ALT  21  /  AlkPhos  36<L>  06-15      MEDICATIONS:  Antibiotics:    Neuro:  acetaminophen   Tablet 650 milliGRAM(s) Oral every 6 hours PRN For Temp greater than 38 C (100.4 F)  acetaminophen   Tablet. 650 milliGRAM(s) Oral every 6 hours PRN Mild Pain (1 - 3)  HYDROmorphone PCA (1 mG/mL) 30 milliLiter(s) PCA Continuous PCA Continuous  ondansetron Injectable 4 milliGRAM(s) IV Push every 6 hours PRN Nausea  ondansetron Injectable 4 milliGRAM(s) IV Push every 6 hours PRN Nausea and/or Vomiting    Cardiac:  lisinopril 40 milliGRAM(s) Oral daily  tamsulosin 0.4 milliGRAM(s) Oral at bedtime    Pulm:  ALBUTerol    90 MICROgram(s) HFA Inhaler 2 Puff(s) Inhalation every 6 hours PRN Shortness of Breath and/or Wheezing  montelukast 10 milliGRAM(s) Oral daily    GI/:  docusate sodium 100 milliGRAM(s) Oral three times a day  pantoprazole    Tablet 40 milliGRAM(s) Oral daily  senna 2 Tablet(s) Oral at bedtime PRN Constipation    Other:   atorvastatin 20 milliGRAM(s) Oral at bedtime  dextrose 40% Gel 15 Gram(s) Oral once PRN Blood Glucose LESS THAN 70 milliGRAM(s)/deciliter  dextrose 5%. 1000 milliLiter(s) IV Continuous <Continuous>  dextrose 50% Injectable 12.5 Gram(s) IV Push once  dextrose 50% Injectable 25 Gram(s) IV Push once  dextrose 50% Injectable 25 Gram(s) IV Push once  glucagon  Injectable 1 milliGRAM(s) IntraMuscular once PRN Glucose LESS THAN 70 milligrams/deciliter  insulin lispro (HumaLOG) corrective regimen sliding scale   SubCutaneous three times a day before meals  insulin lispro (HumaLOG) corrective regimen sliding scale   SubCutaneous at bedtime  levothyroxine 75 MICROGram(s) Oral daily  lidocaine 1% Injectable 0.2 milliLiter(s) Local Injection once  naloxone Injectable 0.1 milliGRAM(s) IV Push every 3 minutes PRN For ANY of the following changes in patient status:  A. RR LESS THAN 10 breaths per minute, B. Oxygen saturation LESS THAN 90%, C. Sedation score of 6  sodium chloride 0.9% with potassium chloride 20 mEq/L 1000 milliLiter(s) IV Continuous <Continuous>    DIET: [] Regular [x] CCD [] Renal [] Puree [] Dysphagia [] Tube Feeds:     IMAGING:

## 2018-06-16 NOTE — PROGRESS NOTE ADULT - SUBJECTIVE AND OBJECTIVE BOX
Day __1_ of Anesthesia Pain Management Service    SUBJECTIVE:    Pain Scale Score	At rest: ___ 	With Activity: ___ 	[ x] Refer to charted pain scores    THERAPY:    [ ] IV PCA Morphine		[ ] 5 mg/mL	[ ] 1 mg/mL  [x ] IV PCA Hydromorphone	[ ] 5 mg/mL	[x ] 1 mg/mL  [ ] IV PCA Fentanyl		[ ] 50 micrograms/mL    Demand dose 0.2mg    lockout  6  (minutes) 0Continuous Rate          MEDICATIONS  (STANDING):  atorvastatin 20 milliGRAM(s) Oral at bedtime  ceFAZolin   IVPB 2000 milliGRAM(s) IV Intermittent once  dextrose 5%. 1000 milliLiter(s) (50 mL/Hr) IV Continuous <Continuous>  dextrose 50% Injectable 12.5 Gram(s) IV Push once  dextrose 50% Injectable 25 Gram(s) IV Push once  dextrose 50% Injectable 25 Gram(s) IV Push once  docusate sodium 100 milliGRAM(s) Oral three times a day  HYDROmorphone PCA (1 mG/mL) 30 milliLiter(s) PCA Continuous PCA Continuous  insulin lispro (HumaLOG) corrective regimen sliding scale   SubCutaneous three times a day before meals  insulin lispro (HumaLOG) corrective regimen sliding scale   SubCutaneous at bedtime  levothyroxine 75 MICROGram(s) Oral daily  lidocaine 1% Injectable 0.2 milliLiter(s) Local Injection once  lisinopril 40 milliGRAM(s) Oral daily  montelukast 10 milliGRAM(s) Oral daily  pantoprazole    Tablet 40 milliGRAM(s) Oral daily  sodium chloride 0.9% with potassium chloride 20 mEq/L 1000 milliLiter(s) (75 mL/Hr) IV Continuous <Continuous>  tamsulosin 0.4 milliGRAM(s) Oral at bedtime    MEDICATIONS  (PRN):  acetaminophen   Tablet 650 milliGRAM(s) Oral every 6 hours PRN For Temp greater than 38 C (100.4 F)  acetaminophen   Tablet. 650 milliGRAM(s) Oral every 6 hours PRN Mild Pain (1 - 3)  ALBUTerol    90 MICROgram(s) HFA Inhaler 2 Puff(s) Inhalation every 6 hours PRN Shortness of Breath and/or Wheezing  dextrose 40% Gel 15 Gram(s) Oral once PRN Blood Glucose LESS THAN 70 milliGRAM(s)/deciliter  glucagon  Injectable 1 milliGRAM(s) IntraMuscular once PRN Glucose LESS THAN 70 milligrams/deciliter  naloxone Injectable 0.1 milliGRAM(s) IV Push every 3 minutes PRN For ANY of the following changes in patient status:  A. RR LESS THAN 10 breaths per minute, B. Oxygen saturation LESS THAN 90%, C. Sedation score of 6  ondansetron Injectable 4 milliGRAM(s) IV Push every 6 hours PRN Nausea  ondansetron Injectable 4 milliGRAM(s) IV Push every 6 hours PRN Nausea and/or Vomiting  senna 2 Tablet(s) Oral at bedtime PRN Constipation      OBJECTIVE:    Sedation Score:	[x ] Alert	[ ] Drowsy 	[ ] Arousable	[ ] Asleep	[ ] Unresponsive    Side Effects:	[ x] None	[ ] Nausea	[ ] Vomiting	[ ] Pruritus  		[ ] Other:    Vital Signs Last 24 Hrs  T(C): 36.6 (16 Jun 2018 08:36), Max: 37 (15 Fred 2018 20:00)  T(F): 97.9 (16 Jun 2018 08:36), Max: 98.6 (15 Fred 2018 20:00)  HR: 78 (16 Jun 2018 08:36) (67 - 90)  BP: 151/66 (16 Jun 2018 08:36) (121/62 - 160/79)  BP(mean): 109 (15 Fred 2018 21:00) (86 - 110)  RR: 16 (16 Jun 2018 08:36) (14 - 18)  SpO2: 97% (16 Jun 2018 08:36) (95% - 100%)    ASSESSMENT/ PLAN    Therapy to  be:	[x ] Continue   [ ] Discontinued   [ ] Change to prn Analgesics    Documentation and Verification of current medications:   [X] Done	[ ] Not done, not elligible    Comments: I was physically present for the key portionjs of the evaluation and management service provided. I agree with the above history, physical, and plan which I have reviewed and edited where appropriate

## 2018-06-17 LAB
GLUCOSE BLDC GLUCOMTR-MCNC: 156 MG/DL — HIGH (ref 70–99)
GLUCOSE BLDC GLUCOMTR-MCNC: 188 MG/DL — HIGH (ref 70–99)
GLUCOSE BLDC GLUCOMTR-MCNC: 194 MG/DL — HIGH (ref 70–99)
GLUCOSE BLDC GLUCOMTR-MCNC: 271 MG/DL — HIGH (ref 70–99)

## 2018-06-17 RX ORDER — HYDROMORPHONE HYDROCHLORIDE 2 MG/ML
0.5 INJECTION INTRAMUSCULAR; INTRAVENOUS; SUBCUTANEOUS
Qty: 0 | Refills: 0 | Status: DISCONTINUED | OUTPATIENT
Start: 2018-06-17 | End: 2018-06-18

## 2018-06-17 RX ORDER — OXYCODONE HYDROCHLORIDE 5 MG/1
10 TABLET ORAL EVERY 4 HOURS
Qty: 0 | Refills: 0 | Status: DISCONTINUED | OUTPATIENT
Start: 2018-06-17 | End: 2018-06-18

## 2018-06-17 RX ORDER — OXYCODONE HYDROCHLORIDE 5 MG/1
5 TABLET ORAL EVERY 4 HOURS
Qty: 0 | Refills: 0 | Status: DISCONTINUED | OUTPATIENT
Start: 2018-06-17 | End: 2018-06-18

## 2018-06-17 RX ADMIN — ENOXAPARIN SODIUM 40 MILLIGRAM(S): 100 INJECTION SUBCUTANEOUS at 21:49

## 2018-06-17 RX ADMIN — LISINOPRIL 40 MILLIGRAM(S): 2.5 TABLET ORAL at 06:26

## 2018-06-17 RX ADMIN — OXYCODONE HYDROCHLORIDE 5 MILLIGRAM(S): 5 TABLET ORAL at 11:46

## 2018-06-17 RX ADMIN — Medication 100 MILLIGRAM(S): at 21:49

## 2018-06-17 RX ADMIN — MONTELUKAST 10 MILLIGRAM(S): 4 TABLET, CHEWABLE ORAL at 11:46

## 2018-06-17 RX ADMIN — Medication 1: at 08:15

## 2018-06-17 RX ADMIN — HYDROMORPHONE HYDROCHLORIDE 30 MILLILITER(S): 2 INJECTION INTRAMUSCULAR; INTRAVENOUS; SUBCUTANEOUS at 06:28

## 2018-06-17 RX ADMIN — OXYCODONE HYDROCHLORIDE 5 MILLIGRAM(S): 5 TABLET ORAL at 12:45

## 2018-06-17 RX ADMIN — HYDROMORPHONE HYDROCHLORIDE 30 MILLILITER(S): 2 INJECTION INTRAMUSCULAR; INTRAVENOUS; SUBCUTANEOUS at 02:17

## 2018-06-17 RX ADMIN — OXYCODONE HYDROCHLORIDE 5 MILLIGRAM(S): 5 TABLET ORAL at 21:51

## 2018-06-17 RX ADMIN — SENNA PLUS 2 TABLET(S): 8.6 TABLET ORAL at 21:49

## 2018-06-17 RX ADMIN — Medication 75 MICROGRAM(S): at 06:26

## 2018-06-17 RX ADMIN — Medication 3: at 12:28

## 2018-06-17 RX ADMIN — OXYCODONE HYDROCHLORIDE 5 MILLIGRAM(S): 5 TABLET ORAL at 22:21

## 2018-06-17 RX ADMIN — ATORVASTATIN CALCIUM 20 MILLIGRAM(S): 80 TABLET, FILM COATED ORAL at 21:49

## 2018-06-17 RX ADMIN — Medication 1: at 18:43

## 2018-06-17 RX ADMIN — PANTOPRAZOLE SODIUM 40 MILLIGRAM(S): 20 TABLET, DELAYED RELEASE ORAL at 11:46

## 2018-06-17 RX ADMIN — TAMSULOSIN HYDROCHLORIDE 0.4 MILLIGRAM(S): 0.4 CAPSULE ORAL at 21:49

## 2018-06-17 NOTE — PROGRESS NOTE ADULT - SUBJECTIVE AND OBJECTIVE BOX
Day __ of Anesthesia Pain Management Service    SUBJECTIVE: Patient is doing well with IV PCA    Pain Scale Score:	[X] Refer to charted pain scores    THERAPY:    [ ] IV PCA Morphine		[ ] 5 mg/mL	[ ] 1 mg/mL  [X] IV PCA Hydromorphone	[ ] 5 mg/mL	[X] 1 mg/mL  [ ] IV PCA Fentanyl		[ ] 50 micrograms/mL    Demand dose: 0.2 mg     Lockout: 6 minutes   Continuous Rate: 0 mg/hr  4 Hour Limit: 4 mg    MEDICATIONS  (STANDING):  atorvastatin 20 milliGRAM(s) Oral at bedtime  dextrose 5%. 1000 milliLiter(s) (50 mL/Hr) IV Continuous <Continuous>  dextrose 50% Injectable 12.5 Gram(s) IV Push once  dextrose 50% Injectable 25 Gram(s) IV Push once  dextrose 50% Injectable 25 Gram(s) IV Push once  docusate sodium 100 milliGRAM(s) Oral three times a day  enoxaparin Injectable 40 milliGRAM(s) SubCutaneous at bedtime  insulin lispro (HumaLOG) corrective regimen sliding scale   SubCutaneous at bedtime  insulin lispro (HumaLOG) corrective regimen sliding scale   SubCutaneous three times a day before meals  levothyroxine 75 MICROGram(s) Oral daily  lidocaine 1% Injectable 0.2 milliLiter(s) Local Injection once  lisinopril 40 milliGRAM(s) Oral daily  montelukast 10 milliGRAM(s) Oral daily  pantoprazole    Tablet 40 milliGRAM(s) Oral daily  senna 2 Tablet(s) Oral at bedtime  sodium chloride 0.9% with potassium chloride 20 mEq/L 1000 milliLiter(s) (75 mL/Hr) IV Continuous <Continuous>  tamsulosin 0.4 milliGRAM(s) Oral at bedtime    MEDICATIONS  (PRN):  acetaminophen   Tablet 650 milliGRAM(s) Oral every 6 hours PRN For Temp greater than 38 C (100.4 F)  acetaminophen   Tablet. 650 milliGRAM(s) Oral every 6 hours PRN Mild Pain (1 - 3)  ALBUTerol    90 MICROgram(s) HFA Inhaler 2 Puff(s) Inhalation every 6 hours PRN Shortness of Breath and/or Wheezing  dextrose 40% Gel 15 Gram(s) Oral once PRN Blood Glucose LESS THAN 70 milliGRAM(s)/deciliter  glucagon  Injectable 1 milliGRAM(s) IntraMuscular once PRN Glucose LESS THAN 70 milligrams/deciliter  HYDROmorphone  Injectable 0.5 milliGRAM(s) IV Push every 3 hours PRN breakthrough pain  naloxone Injectable 0.1 milliGRAM(s) IV Push every 3 minutes PRN For ANY of the following changes in patient status:  A. RR LESS THAN 10 breaths per minute, B. Oxygen saturation LESS THAN 90%, C. Sedation score of 6  ondansetron Injectable 4 milliGRAM(s) IV Push every 6 hours PRN Nausea and/or Vomiting  oxyCODONE    IR 5 milliGRAM(s) Oral every 4 hours PRN Moderate Pain (4 - 6)  oxyCODONE    IR 10 milliGRAM(s) Oral every 4 hours PRN Severe Pain (7 - 10)      OBJECTIVE:    Sedation Score:	[ X] Alert	[ ] Drowsy 	[ ] Arousable	[ ] Asleep	[ ] Unresponsive    Side Effects:	[X ] None	[ ] Nausea	[ ] Vomiting	[ ] Pruritus  		[ ] Other:    Vital Signs Last 24 Hrs  T(C): 37.1 (17 Jun 2018 08:55), Max: 37.1 (16 Jun 2018 21:34)  T(F): 98.8 (17 Jun 2018 08:55), Max: 98.8 (16 Jun 2018 21:34)  HR: 86 (17 Jun 2018 08:55) (80 - 94)  BP: 121/72 (17 Jun 2018 08:55) (104/55 - 143/73)  BP(mean): --  RR: 16 (17 Jun 2018 08:55) (16 - 18)  SpO2: 98% (17 Jun 2018 08:55) (97% - 98%)    ASSESSMENT/ PLAN    Therapy to  be:               [] Continued   [ x] Discontinued   [ ] Changed to PRN Analgesics    Documentation and Verification of current medications:   [X] Done	[ ] Not done, not eligible    Comments: Discontinued by primary service

## 2018-06-17 NOTE — PROGRESS NOTE ADULT - ASSESSMENT
74 y/o male  with PMH of HTN, HLD, DM type 2 , chronic back pain .  Pt has been experiencing  lower back pain , radiating to both legs with numbness and tingling sensation difficulty  walking for few years  also c/o numbness and tinging sensation to UE  followed by neurology, MRI of cervical, thoracic and lumbar  revealed T10-T12 myelomalacia secondary to stenosis , also noted L4-5 stenosis . Pt tried multiple conservative management in the past without any improvement . Presents to PST for scheduled T10-T12 and L4-L5  decompressive Lumbar Laminectomy Insitu Fusion on 6/15/2018. (08 Jun 2018 16:53)    PROCEDURE: Adm 6/9 T10-12 and L4 Lami     POD#2    PLAN:  Neuro: Cont HMV.  DC PCA today.  Inc activity/OOB.     Respiratory: Patient instructed to use incentive spirometer [ X] YES [ ] NO                DVT ppx: [X ] SQL [ ] SQH and Venodynes [ ] Left [ ] Right [ X] Bilateral    Discharge planning: PT-Home PT w/RW.  Stairs -P

## 2018-06-17 NOTE — PROGRESS NOTE ADULT - SUBJECTIVE AND OBJECTIVE BOX
SUBJECTIVE:  In chair thsi AM. Some incision pain.     OVERNIGHT EVENTS: None    Vital Signs Last 24 Hrs  T(C): 37.1 (17 Jun 2018 08:55), Max: 37.1 (16 Jun 2018 21:34)  T(F): 98.8 (17 Jun 2018 08:55), Max: 98.8 (16 Jun 2018 21:34)  HR: 86 (17 Jun 2018 08:55) (80 - 94)  BP: 121/72 (17 Jun 2018 08:55) (104/55 - 143/73)  BP(mean): --  RR: 16 (17 Jun 2018 08:55) (16 - 18)  SpO2: 98% (17 Jun 2018 08:55) (97% - 98%)  IVF: [X ] IVL [ ] NS+K@   DIET: [ ] Regular [X ] CCD [ ] Renal [ ] Puree [ ] Dysphagia [ ] Tube Feeds:   PCA: [ X] YES-DC today.  CAMPUZANO: [ ] YES [X ] NO [X ] VOIDING   BM: [ ] YES [X ] NO     DRAINS: [ ] ELEANOR (cc/24h) [ X] HMV 75(cc/24h)    PHYSICAL EXAM:    Constitutional: No Acute Distress     Neurological: AOx3, Following Commands, Moving all Extremities     Motor exam:          Upper extremity                         Delt     Bicep     Tricep    HG                                                 R         5/5        5/5        5/5       5/5                                               L          5/5        5/5        5/5       5/5          Lower extremity                        HF         KF        KE       DF         PF                                                  R        5/5        5/5        5/5      5/5       5/5                                               L        4+/5      5/5       5/5       5/5        5/5                                                 Sensation: [X] intact to light touch  [] decreased:     Pulmonary: Clear to Auscultation, No rales, No rhonchi, No wheezes     Cardiovascular: S1, S2, Regular rate and rhythm     Gastrointestinal: Soft, Non-tender, Non-distended     Extremities: No calf tenderness     Incision:     LABS:                        12.2   11.8  )-----------( 185      ( 16 Jun 2018 06:02 )             35.7    06-16    138  |  103  |  18  ----------------------------<  138<H>  4.4   |  26  |  0.99    Ca    8.5      16 Jun 2018 06:02    TPro  6.5  /  Alb  3.6  /  TBili  0.2  /  DBili  x   /  AST  19  /  ALT  21  /  AlkPhos  36<L>  06-15    IMAGING:     MEDICATIONS  (STANDING):< from: Xray Spine Single View (06.15.18 @ 08:22) >  For the purposes of this dictation, the last well formed disc will be   presumed to be L5-S1.     Initial image demonstrates posterior localizer at the level of the L4   spinous process and a second localizer at the level of the L1 spinous   process. Follow-up image demonstrates posterior localizer at the level of   the T12 spinous process.    There is multilevel spondylosis. There are vascular calcifications.    atorvastatin 20 milliGRAM(s) Oral at bedtime  dextrose 5%. 1000 milliLiter(s) (50 mL/Hr) IV Continuous <Continuous>  dextrose 50% Injectable 12.5 Gram(s) IV Push once  dextrose 50% Injectable 25 Gram(s) IV Push once  dextrose 50% Injectable 25 Gram(s) IV Push once  docusate sodium 100 milliGRAM(s) Oral three times a day  enoxaparin Injectable 40 milliGRAM(s) SubCutaneous at bedtime  insulin lispro (HumaLOG) corrective regimen sliding scale   SubCutaneous at bedtime  insulin lispro (HumaLOG) corrective regimen sliding scale   SubCutaneous three times a day before meals  levothyroxine 75 MICROGram(s) Oral daily  lidocaine 1% Injectable 0.2 milliLiter(s) Local Injection once  lisinopril 40 milliGRAM(s) Oral daily  montelukast 10 milliGRAM(s) Oral daily  pantoprazole    Tablet 40 milliGRAM(s) Oral daily  senna 2 Tablet(s) Oral at bedtime  sodium chloride 0.9% with potassium chloride 20 mEq/L 1000 milliLiter(s) (75 mL/Hr) IV Continuous <Continuous>  tamsulosin 0.4 milliGRAM(s) Oral at bedtime    MEDICATIONS  (PRN):  acetaminophen   Tablet 650 milliGRAM(s) Oral every 6 hours PRN For Temp greater than 38 C (100.4 F)  acetaminophen   Tablet. 650 milliGRAM(s) Oral every 6 hours PRN Mild Pain (1 - 3)  ALBUTerol    90 MICROgram(s) HFA Inhaler 2 Puff(s) Inhalation every 6 hours PRN Shortness of Breath and/or Wheezing  dextrose 40% Gel 15 Gram(s) Oral once PRN Blood Glucose LESS THAN 70 milliGRAM(s)/deciliter  glucagon  Injectable 1 milliGRAM(s) IntraMuscular once PRN Glucose LESS THAN 70 milligrams/deciliter  HYDROmorphone  Injectable 0.5 milliGRAM(s) IV Push every 3 hours PRN breakthrough pain  naloxone Injectable 0.1 milliGRAM(s) IV Push every 3 minutes PRN For ANY of the following changes in patient status:  A. RR LESS THAN 10 breaths per minute, B. Oxygen saturation LESS THAN 90%, C. Sedation score of 6  ondansetron Injectable 4 milliGRAM(s) IV Push every 6 hours PRN Nausea and/or Vomiting  oxyCODONE    IR 5 milliGRAM(s) Oral every 4 hours PRN Moderate Pain (4 - 6)  oxyCODONE    IR 10 milliGRAM(s) Oral every 4 hours PRN Severe Pain (7 - 10)

## 2018-06-18 ENCOUNTER — TRANSCRIPTION ENCOUNTER (OUTPATIENT)
Age: 73
End: 2018-06-18

## 2018-06-18 VITALS
SYSTOLIC BLOOD PRESSURE: 118 MMHG | TEMPERATURE: 98 F | DIASTOLIC BLOOD PRESSURE: 70 MMHG | OXYGEN SATURATION: 95 % | RESPIRATION RATE: 18 BRPM

## 2018-06-18 DIAGNOSIS — M48.00 SPINAL STENOSIS, SITE UNSPECIFIED: ICD-10-CM

## 2018-06-18 LAB
GLUCOSE BLDC GLUCOMTR-MCNC: 155 MG/DL — HIGH (ref 70–99)
GLUCOSE BLDC GLUCOMTR-MCNC: 257 MG/DL — HIGH (ref 70–99)

## 2018-06-18 PROCEDURE — 80053 COMPREHEN METABOLIC PANEL: CPT

## 2018-06-18 PROCEDURE — 86901 BLOOD TYPING SEROLOGIC RH(D): CPT

## 2018-06-18 PROCEDURE — C1889: CPT

## 2018-06-18 PROCEDURE — 86900 BLOOD TYPING SEROLOGIC ABO: CPT

## 2018-06-18 PROCEDURE — 82962 GLUCOSE BLOOD TEST: CPT

## 2018-06-18 PROCEDURE — 97161 PT EVAL LOW COMPLEX 20 MIN: CPT

## 2018-06-18 PROCEDURE — 97110 THERAPEUTIC EXERCISES: CPT

## 2018-06-18 PROCEDURE — 97116 GAIT TRAINING THERAPY: CPT

## 2018-06-18 PROCEDURE — 80048 BASIC METABOLIC PNL TOTAL CA: CPT

## 2018-06-18 PROCEDURE — 72020 X-RAY EXAM OF SPINE 1 VIEW: CPT

## 2018-06-18 PROCEDURE — 85027 COMPLETE CBC AUTOMATED: CPT

## 2018-06-18 RX ORDER — MONTELUKAST 4 MG/1
1 TABLET, CHEWABLE ORAL
Qty: 0 | Refills: 0 | COMMUNITY

## 2018-06-18 RX ORDER — DOCUSATE SODIUM 100 MG
1 CAPSULE ORAL
Qty: 0 | Refills: 0 | COMMUNITY
Start: 2018-06-18

## 2018-06-18 RX ORDER — ACETAMINOPHEN 500 MG
2 TABLET ORAL
Qty: 0 | Refills: 0 | COMMUNITY
Start: 2018-06-18

## 2018-06-18 RX ORDER — OXYCODONE HYDROCHLORIDE 5 MG/1
1 TABLET ORAL
Qty: 42 | Refills: 0 | OUTPATIENT
Start: 2018-06-18 | End: 2018-06-24

## 2018-06-18 RX ORDER — OXYCODONE HYDROCHLORIDE 5 MG/1
1 TABLET ORAL
Qty: 20 | Refills: 0 | OUTPATIENT
Start: 2018-06-18 | End: 2018-06-22

## 2018-06-18 RX ORDER — METOCLOPRAMIDE HCL 10 MG
5 TABLET ORAL ONCE
Qty: 0 | Refills: 0 | Status: COMPLETED | OUTPATIENT
Start: 2018-06-18 | End: 2018-06-18

## 2018-06-18 RX ORDER — ASPIRIN/CALCIUM CARB/MAGNESIUM 324 MG
1 TABLET ORAL
Qty: 0 | Refills: 0 | COMMUNITY

## 2018-06-18 RX ORDER — SENNA PLUS 8.6 MG/1
2 TABLET ORAL
Qty: 0 | Refills: 0 | COMMUNITY
Start: 2018-06-18

## 2018-06-18 RX ADMIN — MONTELUKAST 10 MILLIGRAM(S): 4 TABLET, CHEWABLE ORAL at 11:00

## 2018-06-18 RX ADMIN — PANTOPRAZOLE SODIUM 40 MILLIGRAM(S): 20 TABLET, DELAYED RELEASE ORAL at 11:00

## 2018-06-18 RX ADMIN — Medication 75 MICROGRAM(S): at 06:20

## 2018-06-18 RX ADMIN — Medication 1: at 08:13

## 2018-06-18 RX ADMIN — Medication 650 MILLIGRAM(S): at 11:30

## 2018-06-18 RX ADMIN — Medication 650 MILLIGRAM(S): at 10:57

## 2018-06-18 RX ADMIN — OXYCODONE HYDROCHLORIDE 5 MILLIGRAM(S): 5 TABLET ORAL at 08:10

## 2018-06-18 RX ADMIN — OXYCODONE HYDROCHLORIDE 5 MILLIGRAM(S): 5 TABLET ORAL at 07:41

## 2018-06-18 RX ADMIN — OXYCODONE HYDROCHLORIDE 5 MILLIGRAM(S): 5 TABLET ORAL at 17:50

## 2018-06-18 RX ADMIN — Medication 100 MILLIGRAM(S): at 13:25

## 2018-06-18 RX ADMIN — Medication 5 MILLIGRAM(S): at 13:25

## 2018-06-18 RX ADMIN — DEXTROSE MONOHYDRATE, SODIUM CHLORIDE, AND POTASSIUM CHLORIDE 75 MILLILITER(S): 50; .745; 4.5 INJECTION, SOLUTION INTRAVENOUS at 06:21

## 2018-06-18 RX ADMIN — Medication 100 MILLIGRAM(S): at 06:20

## 2018-06-18 RX ADMIN — LISINOPRIL 40 MILLIGRAM(S): 2.5 TABLET ORAL at 06:20

## 2018-06-18 RX ADMIN — Medication 3: at 12:40

## 2018-06-18 RX ADMIN — OXYCODONE HYDROCHLORIDE 5 MILLIGRAM(S): 5 TABLET ORAL at 17:21

## 2018-06-18 NOTE — PROGRESS NOTE ADULT - SUBJECTIVE AND OBJECTIVE BOX
SUBJECTIVE: Patient was seen and evaluated at bedside. Patient is resting comfortably in bed and is in no new acute distress     OVERNIGHT EVENTS: none     Vital Signs Last 24 Hrs  T(C): 37.2 (18 Jun 2018 05:37), Max: 37.7 (17 Jun 2018 17:12)  T(F): 99 (18 Jun 2018 05:37), Max: 99.8 (17 Jun 2018 17:12)  HR: 90 (18 Jun 2018 05:37) (83 - 92)  BP: 138/72 (18 Jun 2018 05:37) (123/69 - 138/72)  BP(mean): --  RR: 16 (18 Jun 2018 05:37) (16 - 18)  SpO2: 98% (18 Jun 2018 05:37) (96% - 99%)    PHYSICAL EXAM:    General: No Acute Distress     Neurological: Awake, alert oriented to person, place and time, Following Commands, PERRL, EOMI, Face Symmetrical, Speech Fluent, Moving all extremities, Muscle Strength normal in all four extremities, No Drift, Sensation to Light Touch Intact    Pulmonary: Clear to Auscultation, No Rales, No Rhonchi, No Wheezes     Cardiovascular: S1, S2, Regular Rate and Rhythm     Gastrointestinal: Soft, Nontender, Nondistended     Incision: intact     LABS:         Hemoglobin A1C, Whole Blood: 5.8 % (06-08 @ 20:18)      06-17 @ 07:01  -  06-18 @ 07:00  --------------------------------------------------------  IN: 2225 mL / OUT: 1275 mL / NET: 950 mL    06-18 @ 07:01  -  06-18 @ 12:25  --------------------------------------------------------  IN: 600 mL / OUT: 415 mL / NET: 185 mL      DRAINS: hmv 10     MEDICATIONS:  Antibiotics:    Neuro:  acetaminophen   Tablet 650 milliGRAM(s) Oral every 6 hours PRN For Temp greater than 38 C (100.4 F)  acetaminophen   Tablet. 650 milliGRAM(s) Oral every 6 hours PRN Mild Pain (1 - 3)  HYDROmorphone  Injectable 0.5 milliGRAM(s) IV Push every 3 hours PRN breakthrough pain  ondansetron Injectable 4 milliGRAM(s) IV Push every 6 hours PRN Nausea and/or Vomiting  oxyCODONE    IR 5 milliGRAM(s) Oral every 4 hours PRN Moderate Pain (4 - 6)  oxyCODONE    IR 10 milliGRAM(s) Oral every 4 hours PRN Severe Pain (7 - 10)    Cardiac:  lisinopril 40 milliGRAM(s) Oral daily  tamsulosin 0.4 milliGRAM(s) Oral at bedtime    Pulm:  ALBUTerol    90 MICROgram(s) HFA Inhaler 2 Puff(s) Inhalation every 6 hours PRN Shortness of Breath and/or Wheezing  montelukast 10 milliGRAM(s) Oral daily    GI/:  docusate sodium 100 milliGRAM(s) Oral three times a day  metoclopramide 5 milliGRAM(s) Oral once  pantoprazole    Tablet 40 milliGRAM(s) Oral daily  senna 2 Tablet(s) Oral at bedtime    Other:   atorvastatin 20 milliGRAM(s) Oral at bedtime  dextrose 40% Gel 15 Gram(s) Oral once PRN Blood Glucose LESS THAN 70 milliGRAM(s)/deciliter  dextrose 5%. 1000 milliLiter(s) IV Continuous <Continuous>  dextrose 50% Injectable 12.5 Gram(s) IV Push once  dextrose 50% Injectable 25 Gram(s) IV Push once  dextrose 50% Injectable 25 Gram(s) IV Push once  enoxaparin Injectable 40 milliGRAM(s) SubCutaneous at bedtime  glucagon  Injectable 1 milliGRAM(s) IntraMuscular once PRN Glucose LESS THAN 70 milligrams/deciliter  insulin lispro (HumaLOG) corrective regimen sliding scale   SubCutaneous at bedtime  insulin lispro (HumaLOG) corrective regimen sliding scale   SubCutaneous three times a day before meals  levothyroxine 75 MICROGram(s) Oral daily  lidocaine 1% Injectable 0.2 milliLiter(s) Local Injection once  naloxone Injectable 0.1 milliGRAM(s) IV Push every 3 minutes PRN For ANY of the following changes in patient status:  A. RR LESS THAN 10 breaths per minute, B. Oxygen saturation LESS THAN 90%, C. Sedation score of 6  sodium chloride 0.9% with potassium chloride 20 mEq/L 1000 milliLiter(s) IV Continuous <Continuous>    DIET: [] Regular [] CCD [] Renal [] Puree [] Dysphagia [] Tube Feeds: regular     IMAGING: no new imaging

## 2018-06-18 NOTE — DISCHARGE NOTE ADULT - NS AS ACTIVITY OBS
No Heavy lifting/straining/Walking-Outdoors allowed/Showering allowed/Walking-Indoors allowed/Stairs allowed/Do not make important decisions/Do not drive or operate machinery

## 2018-06-18 NOTE — DISCHARGE NOTE ADULT - CARE PROVIDER_API CALL
Josiah Avila (MD), Neurological Surgery  [address] [place] [address] [address]  [place] [address]  Phone: [phone]  Fax: [phone]

## 2018-06-18 NOTE — DISCHARGE NOTE ADULT - PATIENT PORTAL LINK FT
You can access the [facility] Portal, offered by [facility], by registering with the following website: [email or web address]

## 2018-06-18 NOTE — DISCHARGE NOTE ADULT - MEDICATION SUMMARY - MEDICATIONS TO TAKE
I will START or STAY ON the medications listed below when I get home from the hospital:    oxyCODONE 5 mg oral tablet  -- 1 tab(s) by mouth every 4 hours, As needed, Moderate Pain (4 - 6) MDD:6  -- Indication: For pain    acetaminophen 325 mg oral tablet  -- 2 tab(s) by mouth every 6 hours, As needed, Mild Pain (1 - 3)  -- Indication: For pain    oxyCODONE 10 mg oral tablet  -- 1 tab(s) by mouth every 6 hours, As Needed -Severe Pain (7 - 10) MDD:4  -- Indication: For pain    ramipril 10 mg oral tablet  -- 1 tab(s) by mouth once a day  -- Indication: For blood pressure     tamsulosin 0.4 mg oral capsule  -- 1 cap(s) by mouth once a day  -- Indication: For urinary retention    metFORMIN 500 mg oral tablet  -- 1 tab(s) by mouth 2 times a day  -- Indication: For Diabetes     atorvastatin 20 mg oral tablet  -- 1 tab(s) by mouth once a day  -- Indication: For Cholesterol    Proventil HFA 90 mcg/inh inhalation aerosol  -- 2 puff(s) inhaled 4 times a day, As Needed  -- Indication: For breathing aid     Advair Diskus 100 mcg-50 mcg inhalation powder  -- 1 puff(s) inhaled 2 times a day  -- Indication: For breathing aid     docusate sodium 100 mg oral capsule  -- 1 cap(s) by mouth 3 times a day  -- Indication: For Stool softener     senna oral tablet  -- 2 tab(s) by mouth once a day (at bedtime)  -- Indication: For Stool softener     montelukast 10 mg oral tablet  -- 1 tab(s) by mouth once a day  -- Indication: For breathing aid     levothyroxine 75 mcg (0.075 mg) oral tablet  -- 1 tab(s) by mouth once a day  -- Indication: For thyroid hormone I will START or STAY ON the medications listed below when I get home from the hospital:    oxyCODONE 5 mg oral tablet  -- 1 tab(s) by mouth every 4 hours, As needed, Moderate Pain (4 - 6) MDD:6  -- Indication: For pain    oxyCODONE 10 mg oral tablet  -- 1 tab(s) by mouth every 6 hours, As Needed -Severe Pain (7 - 10) MDD:4  -- Indication: For pain    acetaminophen 325 mg oral tablet  -- 2 tab(s) by mouth every 6 hours, As needed, Mild Pain (1 - 3)  -- Indication: For pain     ramipril 10 mg oral tablet  -- 1 tab(s) by mouth once a day  -- Indication: For blood pressure     tamsulosin 0.4 mg oral capsule  -- 1 cap(s) by mouth once a day  -- Indication: For urinary retention    metFORMIN 500 mg oral tablet  -- 1 tab(s) by mouth 2 times a day  -- Indication: For Diabetes     atorvastatin 20 mg oral tablet  -- 1 tab(s) by mouth once a day  -- Indication: For Cholesterol    Proventil HFA 90 mcg/inh inhalation aerosol  -- 2 puff(s) inhaled 4 times a day, As Needed  -- Indication: For breathing aid     Advair Diskus 100 mcg-50 mcg inhalation powder  -- 1 puff(s) inhaled 2 times a day  -- Indication: For breathing aid     docusate sodium 100 mg oral capsule  -- 1 cap(s) by mouth 3 times a day  -- Indication: For Stool softener     senna oral tablet  -- 2 tab(s) by mouth once a day (at bedtime)  -- Indication: For Stool softener     montelukast 10 mg oral tablet  -- 1 tab(s) by mouth once a day  -- Indication: For breathing aid     levothyroxine 75 mcg (0.075 mg) oral tablet  -- 1 tab(s) by mouth once a day  -- Indication: For thyroid hormone

## 2018-06-18 NOTE — DISCHARGE NOTE ADULT - ADDITIONAL INSTRUCTIONS
May take shower 4 days after surgery; Let water run over the surgical site and pat dry after shower.   If notices any new weakness, numbness, tingling, severe pain or fever then please come back to hospital.

## 2018-06-18 NOTE — CHART NOTE - NSCHARTNOTEFT_GEN_A_CORE
Patient was seen at bedside. Hemovac was removed from surgical site. Tolerated well. dressing applied.

## 2018-06-18 NOTE — PROGRESS NOTE ADULT - PROBLEM SELECTOR PLAN 1
s/p laminectomy   1 Out of bed   2 continue PT  3 PRN pain meds   4 dvt ppx sql scds   5 dc hemovac   6 continue breathing treatment for copd   7 synthroid   8 lisinopril for blood pressure   9 continue statin   10 ccd diet   11 dc home today

## 2018-06-18 NOTE — PROGRESS NOTE ADULT - ASSESSMENT
HPI:  74 y/o male  with PMH of HTN, HLD, DM type 2 , chronic back pain .  Pt has been experiencing  lower back pain , radiating to both legs with numbness and tingling sensation difficulty  walking for few years  also c/o numbness and tinging sensation to UE  followed by neurology, MRI of cervical, thoracic and lumbar  revealed T10-T12 myelomalacia secondary to stenosis , also noted L4-5 stenosis . Pt tried multiple conservative management in the past without any improvement . Presents to PST for scheduled T10-T12 and L4-L5  decompressive Lumbar Laminectomy Insitu Fusion on 6/15/2018. (08 Jun 2018 16:53)        s/p T10T12 LAMINECTOMY AND L4 LAMINECTOMY-6/15

## 2018-06-18 NOTE — DISCHARGE NOTE ADULT - HOSPITAL COURSE
Patient had a T10-T12 laminectomy and l4 laminectomy for spinal stenosis on 6/15. Post surgery patient was observed in the pacu. Patient was give iv fluids and pain meds. Patient was restarted on routine home meds. Patient was moved to floor once stable. On the floor patient was started on dvt ppx. Patient was seen and cleared by physical therapy for discharge. Patient was given a rolling walker to walk. No other new deficits noted. Patient was discharged home with pain meds and follow up instructions. Patient had a T10-T12 laminectomy and l4 laminectomy for spinal stenosis on 6/15. Post surgery patient was observed in the pacu. Patient was give iv fluids and pain meds. Patient was restarted on routine home meds. Patient was moved to floor once stable. On the floor patient was started on dvt ppx. Patient was seen and cleared by physical therapy for discharge. Hemovac was removed on 6/18.  Patient was given a rolling walker to walk. No other new deficits noted. Patient was discharged home with pain meds and follow up instructions.

## 2018-06-18 NOTE — DISCHARGE NOTE ADULT - REASON FOR ADMISSION
Patient was admitted on 6/9 with history of back pain radiating down to legs. Patient had a T10-T12 laminectomy and L4 laminectomy on 6/15

## 2018-06-18 NOTE — DISCHARGE NOTE ADULT - HOME CARE AGENCY
[facility] [place]  for RN visit the day after discharge to evaluate for home physical therapy services

## 2018-06-18 NOTE — DISCHARGE NOTE ADULT - MEDICATION SUMMARY - MEDICATIONS TO STOP TAKING
I will STOP taking the medications listed below when I get home from the hospital:    aspirin 81 mg oral tablet  -- 1 tab(s) by mouth once a day for cardiac prophylaxis

## 2018-06-18 NOTE — DISCHARGE NOTE ADULT - PLAN OF CARE
Increase activity Follow up with Dr. Avila in 7-10 days-Neurosurgeon; Please call office to confirm appointment.   Follow up with primary care provider in 10 days

## 2018-06-18 NOTE — DISCHARGE NOTE ADULT - CARE PLAN
Principal Discharge DX:	Spinal stenosis  Goal:	Increase activity  Assessment and plan of treatment:	Follow up with Dr. Avila in 7-10 days-Neurosurgeon; Please call office to confirm appointment.   Follow up with primary care provider in 10 days

## 2018-06-21 ENCOUNTER — CLINICAL ADVICE (OUTPATIENT)
Age: 73
End: 2018-06-21

## 2018-06-21 DIAGNOSIS — R06.6 HICCOUGH: ICD-10-CM

## 2018-06-25 ENCOUNTER — APPOINTMENT (OUTPATIENT)
Dept: SPINE | Facility: CLINIC | Age: 73
End: 2018-06-25
Payer: MEDICARE

## 2018-06-25 VITALS
HEIGHT: 65 IN | DIASTOLIC BLOOD PRESSURE: 67 MMHG | HEART RATE: 71 BPM | SYSTOLIC BLOOD PRESSURE: 153 MMHG | WEIGHT: 125 LBS | BODY MASS INDEX: 20.83 KG/M2 | TEMPERATURE: 97.5 F

## 2018-06-25 DIAGNOSIS — Z98.890 OTHER SPECIFIED POSTPROCEDURAL STATES: ICD-10-CM

## 2018-06-25 DIAGNOSIS — M48.04 SPINAL STENOSIS, THORACIC REGION: ICD-10-CM

## 2018-06-25 PROCEDURE — 99024 POSTOP FOLLOW-UP VISIT: CPT

## 2018-07-16 ENCOUNTER — APPOINTMENT (OUTPATIENT)
Dept: SPINE | Facility: CLINIC | Age: 73
End: 2018-07-16
Payer: MEDICARE

## 2018-07-16 VITALS
HEART RATE: 71 BPM | DIASTOLIC BLOOD PRESSURE: 76 MMHG | WEIGHT: 126 LBS | BODY MASS INDEX: 20.99 KG/M2 | HEIGHT: 65 IN | SYSTOLIC BLOOD PRESSURE: 139 MMHG

## 2018-07-16 PROCEDURE — 99024 POSTOP FOLLOW-UP VISIT: CPT

## 2018-07-17 PROBLEM — J44.9 CHRONIC OBSTRUCTIVE PULMONARY DISEASE, UNSPECIFIED: Chronic | Status: ACTIVE | Noted: 2018-06-08

## 2018-08-27 ENCOUNTER — APPOINTMENT (OUTPATIENT)
Dept: SPINE | Facility: CLINIC | Age: 73
End: 2018-08-27
Payer: MEDICARE

## 2018-08-27 VITALS
HEIGHT: 65 IN | SYSTOLIC BLOOD PRESSURE: 155 MMHG | HEART RATE: 73 BPM | DIASTOLIC BLOOD PRESSURE: 72 MMHG | BODY MASS INDEX: 20.99 KG/M2 | WEIGHT: 126 LBS

## 2018-08-27 PROBLEM — M48.03 SPINAL STENOSIS, CERVICOTHORACIC REGION: Chronic | Status: ACTIVE | Noted: 2018-06-08

## 2018-08-27 PROBLEM — D64.9 ANEMIA, UNSPECIFIED: Chronic | Status: ACTIVE | Noted: 2018-06-08

## 2018-08-27 PROBLEM — E11.9 TYPE 2 DIABETES MELLITUS WITHOUT COMPLICATIONS: Chronic | Status: ACTIVE | Noted: 2018-06-08

## 2018-08-27 PROBLEM — I10 ESSENTIAL (PRIMARY) HYPERTENSION: Chronic | Status: ACTIVE | Noted: 2018-06-08

## 2018-08-27 PROBLEM — E78.5 HYPERLIPIDEMIA, UNSPECIFIED: Chronic | Status: ACTIVE | Noted: 2018-06-08

## 2018-08-27 PROBLEM — M54.9 DORSALGIA, UNSPECIFIED: Chronic | Status: ACTIVE | Noted: 2018-06-08

## 2018-08-27 PROCEDURE — 99024 POSTOP FOLLOW-UP VISIT: CPT

## 2018-08-27 RX ORDER — TRAMADOL HYDROCHLORIDE 50 MG/1
50 TABLET, COATED ORAL
Refills: 0 | Status: COMPLETED | COMMUNITY
End: 2018-08-27

## 2018-08-27 RX ORDER — BACLOFEN 5 MG/1
5 TABLET ORAL 3 TIMES DAILY
Qty: 21 | Refills: 0 | Status: COMPLETED | COMMUNITY
Start: 2018-06-21 | End: 2018-08-27

## 2018-08-27 RX ORDER — OXYCODONE 10 MG/1
10 TABLET ORAL
Qty: 20 | Refills: 0 | Status: COMPLETED | COMMUNITY
Start: 2018-06-18 | End: 2018-08-27

## 2018-08-27 RX ORDER — OXYCODONE 5 MG/1
5 TABLET ORAL
Qty: 42 | Refills: 0 | Status: COMPLETED | COMMUNITY
Start: 2018-06-18 | End: 2018-08-27

## 2018-08-27 RX ORDER — NAPROXEN SODIUM 220 MG
TABLET ORAL
Refills: 0 | Status: ACTIVE | COMMUNITY

## 2018-10-29 ENCOUNTER — APPOINTMENT (OUTPATIENT)
Dept: SPINE | Facility: CLINIC | Age: 73
End: 2018-10-29
Payer: MEDICARE

## 2018-10-29 DIAGNOSIS — M48.00 SPINAL STENOSIS, SITE UNSPECIFIED: ICD-10-CM

## 2018-10-29 DIAGNOSIS — R26.89 OTHER ABNORMALITIES OF GAIT AND MOBILITY: ICD-10-CM

## 2018-10-29 PROCEDURE — 99213 OFFICE O/P EST LOW 20 MIN: CPT

## 2018-11-08 ENCOUNTER — APPOINTMENT (OUTPATIENT)
Dept: ORTHOPEDIC SURGERY | Facility: CLINIC | Age: 73
End: 2018-11-08
Payer: MEDICARE

## 2018-11-08 VITALS
WEIGHT: 128 LBS | HEART RATE: 72 BPM | SYSTOLIC BLOOD PRESSURE: 145 MMHG | HEIGHT: 65 IN | DIASTOLIC BLOOD PRESSURE: 78 MMHG | BODY MASS INDEX: 21.33 KG/M2

## 2018-11-08 DIAGNOSIS — M25.551 PAIN IN RIGHT HIP: ICD-10-CM

## 2018-11-08 DIAGNOSIS — M25.552 PAIN IN RIGHT HIP: ICD-10-CM

## 2018-11-08 PROCEDURE — 73521 X-RAY EXAM HIPS BI 2 VIEWS: CPT

## 2018-11-08 PROCEDURE — 73562 X-RAY EXAM OF KNEE 3: CPT | Mod: 50

## 2018-11-08 PROCEDURE — 72100 X-RAY EXAM L-S SPINE 2/3 VWS: CPT

## 2018-11-08 PROCEDURE — 99214 OFFICE O/P EST MOD 30 MIN: CPT

## 2019-01-28 ENCOUNTER — RX RENEWAL (OUTPATIENT)
Age: 74
End: 2019-01-28

## 2019-01-28 RX ORDER — ALBUTEROL SULFATE 2.5 MG/3ML
(2.5 MG/3ML) SOLUTION RESPIRATORY (INHALATION) EVERY 6 HOURS
Qty: 1 | Refills: 3 | Status: ACTIVE | COMMUNITY
Start: 2019-01-28 | End: 1900-01-01

## 2019-02-04 ENCOUNTER — APPOINTMENT (OUTPATIENT)
Dept: SPINE | Facility: CLINIC | Age: 74
End: 2019-02-04
Payer: MEDICARE

## 2019-02-04 VITALS
WEIGHT: 126 LBS | HEART RATE: 69 BPM | OXYGEN SATURATION: 98 % | HEIGHT: 64 IN | BODY MASS INDEX: 21.51 KG/M2 | RESPIRATION RATE: 15 BRPM | DIASTOLIC BLOOD PRESSURE: 76 MMHG | SYSTOLIC BLOOD PRESSURE: 151 MMHG

## 2019-02-04 DIAGNOSIS — M48.07 SPINAL STENOSIS, LUMBOSACRAL REGION: ICD-10-CM

## 2019-02-04 PROCEDURE — 99213 OFFICE O/P EST LOW 20 MIN: CPT

## 2019-02-04 RX ORDER — AMOXICILLIN AND CLAVULANATE POTASSIUM 875; 125 MG/1; MG/1
875-125 TABLET, COATED ORAL
Qty: 20 | Refills: 0 | Status: DISCONTINUED | COMMUNITY
Start: 2019-01-28 | End: 2019-02-04

## 2019-02-04 NOTE — REASON FOR VISIT
[Follow-Up: _____] : a [unfilled] follow-up visit [Spouse] : spouse [FreeTextEntry1] : Minimal remaining pain. No need for regular pain medication. Trying to walk a little bit more.

## 2019-03-11 NOTE — PHYSICAL THERAPY INITIAL EVALUATION ADULT - ASR WT BEARING STATUS EVAL
Quality 111:Pneumonia Vaccination Status For Older Adults: Pneumococcal Vaccination Previously Received Detail Level: Detailed Right LE/Left LE

## 2021-07-10 DIAGNOSIS — K04.7 PERIAPICAL ABSCESS W/OUT SINUS: ICD-10-CM

## 2021-12-07 NOTE — PHYSICAL THERAPY INITIAL EVALUATION ADULT - GAIT DEVIATIONS NOTED, PT EVAL
decreased mckay/decreased velocity of limb motion/decreased step length/decreased weight-shifting ability/shuffling
day(s)

## 2022-08-17 ENCOUNTER — OUTPATIENT (OUTPATIENT)
Dept: OUTPATIENT SERVICES | Facility: HOSPITAL | Age: 77
LOS: 1 days | End: 2022-08-17
Payer: MEDICARE

## 2022-08-17 ENCOUNTER — APPOINTMENT (OUTPATIENT)
Dept: CT IMAGING | Facility: IMAGING CENTER | Age: 77
End: 2022-08-17

## 2022-08-17 DIAGNOSIS — Z98.890 OTHER SPECIFIED POSTPROCEDURAL STATES: Chronic | ICD-10-CM

## 2022-08-17 DIAGNOSIS — Z00.8 ENCOUNTER FOR OTHER GENERAL EXAMINATION: ICD-10-CM

## 2022-08-17 PROCEDURE — 71250 CT THORAX DX C-: CPT

## 2022-08-17 PROCEDURE — 71250 CT THORAX DX C-: CPT | Mod: 26

## 2023-01-27 ENCOUNTER — APPOINTMENT (OUTPATIENT)
Dept: RADIOLOGY | Facility: HOSPITAL | Age: 78
End: 2023-01-27

## 2023-01-27 ENCOUNTER — OUTPATIENT (OUTPATIENT)
Dept: OUTPATIENT SERVICES | Facility: HOSPITAL | Age: 78
LOS: 1 days | End: 2023-01-27
Payer: MEDICARE

## 2023-01-27 DIAGNOSIS — R13.10 DYSPHAGIA, UNSPECIFIED: ICD-10-CM

## 2023-01-27 DIAGNOSIS — Z98.890 OTHER SPECIFIED POSTPROCEDURAL STATES: Chronic | ICD-10-CM

## 2023-01-27 PROCEDURE — 74220 X-RAY XM ESOPHAGUS 1CNTRST: CPT | Mod: 26

## 2023-05-30 ENCOUNTER — APPOINTMENT (OUTPATIENT)
Dept: PULMONOLOGY | Facility: CLINIC | Age: 78
End: 2023-05-30
Payer: MEDICARE

## 2023-05-30 ENCOUNTER — RX RENEWAL (OUTPATIENT)
Age: 78
End: 2023-05-30

## 2023-05-30 VITALS — HEART RATE: 80 BPM | DIASTOLIC BLOOD PRESSURE: 62 MMHG | OXYGEN SATURATION: 99 % | SYSTOLIC BLOOD PRESSURE: 131 MMHG

## 2023-05-30 DIAGNOSIS — E78.5 HYPERLIPIDEMIA, UNSPECIFIED: ICD-10-CM

## 2023-05-30 DIAGNOSIS — M48.02 SPINAL STENOSIS, CERVICAL REGION: ICD-10-CM

## 2023-05-30 DIAGNOSIS — I10 ESSENTIAL (PRIMARY) HYPERTENSION: ICD-10-CM

## 2023-05-30 PROCEDURE — 94010 BREATHING CAPACITY TEST: CPT

## 2023-05-30 PROCEDURE — 94727 GAS DIL/WSHOT DETER LNG VOL: CPT

## 2023-05-30 PROCEDURE — 94729 DIFFUSING CAPACITY: CPT

## 2023-05-30 PROCEDURE — ZZZZZ: CPT

## 2023-05-30 PROCEDURE — 99205 OFFICE O/P NEW HI 60 MIN: CPT | Mod: 25

## 2023-05-30 RX ORDER — MONTELUKAST 10 MG/1
10 TABLET, FILM COATED ORAL
Refills: 0 | Status: DISCONTINUED | COMMUNITY
End: 2023-05-30

## 2023-05-30 RX ORDER — EZETIMIBE 10 MG/1
10 TABLET ORAL
Refills: 0 | Status: ACTIVE | COMMUNITY

## 2023-05-30 RX ORDER — AMOXICILLIN 500 MG/1
500 TABLET, FILM COATED ORAL 3 TIMES DAILY
Qty: 15 | Refills: 0 | Status: DISCONTINUED | COMMUNITY
Start: 2021-07-10 | End: 2023-05-30

## 2023-05-30 RX ORDER — ATORVASTATIN CALCIUM 80 MG/1
80 TABLET, FILM COATED ORAL
Refills: 0 | Status: ACTIVE | COMMUNITY

## 2023-05-30 RX ORDER — DAPAGLIFLOZIN 10 MG/1
10 TABLET, FILM COATED ORAL
Refills: 0 | Status: ACTIVE | COMMUNITY

## 2023-05-30 RX ORDER — METFORMIN HYDROCHLORIDE 500 MG/1
500 TABLET, COATED ORAL
Refills: 0 | Status: ACTIVE | COMMUNITY

## 2023-05-30 NOTE — HISTORY OF PRESENT ILLNESS
[Former] : former [TextBox_4] : JOSE PEREZ is a 78 year old  M referred for pulmonary evaluation for cough and chronic obstructive pulmonary disease.\par \par Positive pers. cough. Present for past 6 months.\par Predominately with eating and at night.\par Positive mucous. Predominantly clear. Positive wheezing. \par Sometimes has SOB at night and with exertion.\par BURKS 1 flight. \par \par Past pulmonary history. COPD DX. prior at [facility]. 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 \par Family history of pulmonary disease. N\par Recent travel N\par Pets N\par \par Believes no recent CXR or CT.\par CT more than 3 years ago. \par \par Does Advair OD and BID does neb. albuterol\par \par Positive GERD not on medication. [TextBox_11] : 1 [TextBox_13] : 40 [YearQuit] : 2008

## 2023-05-30 NOTE — PHYSICAL EXAM
[No Acute Distress] : no acute distress [Normal Oropharynx] : normal oropharynx [Normal Appearance] : normal appearance [No Neck Mass] : no neck mass [Normal Rate/Rhythm] : normal rate/rhythm [Normal S1, S2] : normal s1, s2 [No Murmurs] : no murmurs [No Resp Distress] : no resp distress [Clear to Auscultation Bilaterally] : clear to auscultation bilaterally [Normal to Percussion] : normal to percussion [No Abnormalities] : no abnormalities [Benign] : benign [Normal Gait] : normal gait [No Clubbing] : no clubbing [No Cyanosis] : no cyanosis [No Edema] : no edema [FROM] : FROM [Normal Color/ Pigmentation] : normal color/ pigmentation [No Focal Deficits] : no focal deficits [Oriented x3] : oriented x3 [Normal Affect] : normal affect [TextBox_68] : No active wheezing.

## 2023-05-30 NOTE — ASSESSMENT
[FreeTextEntry1] : Speech and swallow evaluation.\par I have changed his bronchodilator therapy to DuoNeb and budesonide twice a day and albuterol as needed.\par I think this would be the easiest and most cost effective for him since he already is doing nebulizer treatments presently twice per day.\par He will discontinue the Advair.\par Follow-up in 6 weeks to assess response to therapy and make further recommendations.\par \par 80-minute spent evaluation management review of studies and discussion with patient and wife.\par \par

## 2023-05-30 NOTE — DISCUSSION/SUMMARY
[FreeTextEntry1] : Component of chronic obstructive pulmonary disease with component of chronic bronchitis.\par Rule out swallowing disorder and aspiration.\par Cannot exclude component of cough related to GERD.\par Coronary calcifications.  Seeing cardiology.

## 2023-05-30 NOTE — PROCEDURE
[FreeTextEntry1] : 05/30/2023\par Pulmonary function testing\par These data demonstrate a moderate obstructive ventilatory deficit. There is elevation in the RV/TLC ratio indicative of possible air trapping. Diffusion capacity is normal. \par \par / 1 Susana Perez   \par  \par \par \par \par Report date: 8/23/2022 \par  \par  View Order\par \par \par (Report matches study selected on Patient History pane)\par \par \par   \par \par \par \par \par \par \par \par \par EXAM: [id number] - CT CHEST - ORDERED BY: LISA PEREZ\par \par \par PROCEDURE DATE: 08/17/2022\par \par \par \par INTERPRETATION: EXAMINATION: CT CHEST\par \par CLINICAL INDICATION: Dyspnea.\par \par TECHNIQUE: Noncontrast CT of the chest was obtained.\par \par COMPARISON: None.\par \par FINDINGS:\par \par AIRWAYS AND LUNGS: The central tracheobronchial tree is patent. The lungs are clear.\par \par MEDIASTINUM AND PLEURA: There are no enlarged mediastinal, hilar or axillary lymph nodes. The visualized portion of the thyroid gland is unremarkable. There is no pleural effusion. There is no pneumothorax.\par \par HEART AND VESSELS: There is mild cardiomegaly. There are atherosclerotic calcifications of the aorta and coronary arteries. There is no pericardial effusion.\par \par UPPER ABDOMEN: Images of the upper abdomen demonstrate no abnormality.\par \par BONES AND SOFT TISSUES: There are mild degenerative changes of the spine. The soft tissues are unremarkable.\par \par IMPRESSION:\par Clear lungs\par \par --- End of Report ---\par \par \par \par \par \par \par SUSANA PEREZ MD; Attending Radiologist\par This document has been electronically signed. Aug 23 2022 9:02PM

## 2023-06-02 ENCOUNTER — APPOINTMENT (OUTPATIENT)
Dept: CARDIOLOGY | Facility: CLINIC | Age: 78
End: 2023-06-02
Payer: MEDICARE

## 2023-06-02 VITALS
HEIGHT: 64 IN | HEART RATE: 69 BPM | OXYGEN SATURATION: 100 % | DIASTOLIC BLOOD PRESSURE: 69 MMHG | WEIGHT: 126 LBS | SYSTOLIC BLOOD PRESSURE: 151 MMHG | BODY MASS INDEX: 21.51 KG/M2

## 2023-06-02 DIAGNOSIS — M54.16 RADICULOPATHY, LUMBAR REGION: ICD-10-CM

## 2023-06-02 PROCEDURE — 99204 OFFICE O/P NEW MOD 45 MIN: CPT

## 2023-06-02 NOTE — HISTORY OF PRESENT ILLNESS
[FreeTextEntry1] : 77 yo man PMHx of spinal stenosis s/p laminectomy, prior tobacco use c/b COPD, DM2, and HTN who is here as new patient.\par \par 1y hx of b/l thigh, knee, and calf pain that permanently persists at rest, not worse or better with walking. No LE ulcers or edema. No back pain or cardiopulmonary ROS findings. \par \par PMHx: as above\par PSHx: laminectomy\par \par Family hx:\par - Mother and sister: heart attack\par \par Social hx:\par - Stopped smoking 2008, prior 40 pack years\par \par Meds: ASA 81mg daily, lipitor 80mg qhs, metformin 500mg qdaily, farxiga 10mg daily, ramipiril 10mg daily, albuterol, advair, proventil, tamsulosin\par \par

## 2023-06-02 NOTE — ASSESSMENT
[FreeTextEntry1] : Assessment\par 1) Spinal stenosis s/p laminectomy\par 2) Prior tobacco use c/b COPD\par 3) DM2\par 4) HTN\par 5) B/l thigh, knee, and calf pain at rest unrelated to exertion. Balance issue ambulating with cane.\par \par Plan\par 1) B/l LE pain not concerning for claudication, but given risk factors including prior tobacco use and DM2, will obtain b/l LE arterial duplex to r/o PAD\par 2) Will obtain US abdominal aorta to r/o AAA\par 3) F/u with Dr. Avila (neurosurgery)\par \par d/w his son, osorio monae MD

## 2023-06-02 NOTE — PHYSICAL EXAM

## 2023-06-09 ENCOUNTER — RX RENEWAL (OUTPATIENT)
Age: 78
End: 2023-06-09

## 2023-06-09 RX ORDER — IPRATROPIUM BROMIDE AND ALBUTEROL SULFATE 2.5; .5 MG/3ML; MG/3ML
0.5-2.5 (3) SOLUTION RESPIRATORY (INHALATION)
Qty: 1080 | Refills: 0 | Status: ACTIVE | COMMUNITY
Start: 2023-05-30 | End: 1900-01-01

## 2023-07-07 ENCOUNTER — APPOINTMENT (OUTPATIENT)
Dept: ULTRASOUND IMAGING | Facility: IMAGING CENTER | Age: 78
End: 2023-07-07
Payer: MEDICARE

## 2023-07-07 ENCOUNTER — OUTPATIENT (OUTPATIENT)
Dept: OUTPATIENT SERVICES | Facility: HOSPITAL | Age: 78
LOS: 1 days | End: 2023-07-07
Payer: MEDICARE

## 2023-07-07 DIAGNOSIS — Z98.890 OTHER SPECIFIED POSTPROCEDURAL STATES: Chronic | ICD-10-CM

## 2023-07-07 DIAGNOSIS — Z00.8 ENCOUNTER FOR OTHER GENERAL EXAMINATION: ICD-10-CM

## 2023-07-07 DIAGNOSIS — M54.16 RADICULOPATHY, LUMBAR REGION: ICD-10-CM

## 2023-07-07 PROCEDURE — 93925 LOWER EXTREMITY STUDY: CPT

## 2023-07-07 PROCEDURE — 76770 US EXAM ABDO BACK WALL COMP: CPT | Mod: 26

## 2023-07-07 PROCEDURE — 93925 LOWER EXTREMITY STUDY: CPT | Mod: 26

## 2023-07-07 PROCEDURE — 76775 US EXAM ABDO BACK WALL LIM: CPT

## 2023-08-07 ENCOUNTER — APPOINTMENT (OUTPATIENT)
Dept: PULMONOLOGY | Facility: CLINIC | Age: 78
End: 2023-08-07
Payer: MEDICARE

## 2023-08-07 VITALS — OXYGEN SATURATION: 96 % | HEART RATE: 80 BPM | SYSTOLIC BLOOD PRESSURE: 106 MMHG | DIASTOLIC BLOOD PRESSURE: 63 MMHG

## 2023-08-07 PROCEDURE — 94727 GAS DIL/WSHOT DETER LNG VOL: CPT

## 2023-08-07 PROCEDURE — 99214 OFFICE O/P EST MOD 30 MIN: CPT | Mod: 25

## 2023-08-07 PROCEDURE — 94010 BREATHING CAPACITY TEST: CPT

## 2023-08-07 PROCEDURE — 94729 DIFFUSING CAPACITY: CPT

## 2023-08-07 PROCEDURE — 88738 HGB QUANT TRANSCUTANEOUS: CPT

## 2023-08-07 PROCEDURE — ZZZZZ: CPT

## 2023-08-07 RX ORDER — LOSARTAN POTASSIUM 50 MG/1
50 TABLET, FILM COATED ORAL
Qty: 90 | Refills: 3 | Status: ACTIVE | COMMUNITY
Start: 2023-08-07 | End: 1900-01-01

## 2023-08-08 LAB — POCT - HEMOGLOBIN (HGB), QUANTITATIVE, TRANSCUTANEOUS: 12.9

## 2023-08-09 NOTE — HISTORY OF PRESENT ILLNESS
[Former] : former [TextBox_4] : has been doing the DuoNeb and budesonide via nebulizer for 3 months and stopped advair. Some improvement in cough but still preseng. Heart productive. mild Sob intermittently never went for swallow study.  Positive GERD not on medication. [TextBox_11] : 1 [TextBox_13] : 40 [YearQuit] : 2008

## 2023-08-09 NOTE — DISCUSSION/SUMMARY
[FreeTextEntry1] : Component of chronic obstructive pulmonary disease with component of chronic bronchitis. Rule out swallowing disorder and aspiration.  Did not have swallowing study reviewed chart. Cannot exclude component of cough related to GERD. Coronary calcifications.  Seeing cardiology. On ACE inhibitor.  Cough may be related.

## 2023-08-09 NOTE — CONSULT LETTER
[Dear  ___] : Dear ~CARLITA, [Consult Letter:] : I had the pleasure of evaluating your patient, [unfilled]. [Consult Closing:] : Thank you very much for allowing me to participate in the care of this patient.  If you have any questions, please do not hesitate to contact me. [Sincerely,] : Sincerely, [FreeTextEntry2] : Sherif Jacobson MD\par   [FreeTextEntry3] : Abel Valle MD [facility]

## 2023-08-09 NOTE — PROCEDURE
[FreeTextEntry1] : 08/07/2023 Pulmonary function testing There is a moderate ventilatory impairment in a combined obstructive and restrictive pattern. There is elevation in the RV/TLC ratio indicative of possible air trapping. Diffusion capacity is normal.  Mild increase in function compared to May 2023.  1 / 1 Bernardo Gutierrez  Report date:1/27/2023 View Order (Report matches exam selected in Patient History pane)     ACC: [id number] EXAM: XR ESOPH SNGL CON STUDY ORDERED BY: MAURA MERAZ  PROCEDURE DATE: 01/27/2023    INTERPRETATION: CLINICAL INFORMATION: Dysphagia  TECHNIQUE: An esophagram was performed under fluoroscopy utilizing barium as the contrast agent and multiple spot fluoroscopic images were taken in the AP, oblique and lateral projections.  COMPARISON: No similar examinations were available for comparison.  FLUORO TIME: 1.7 minutes  FINDINGS: Preliminary  radiograph of the chest is unremarkable.  The patient swallowed barium without difficulty.  The esophagus demonstrates normal distensibility, contour and course. There is no abnormal extrinsic mass effect. Contrast passes freely into the stomach.  A small hiatal hernia is present but no gastroesophageal reflux was demonstrated during this examination.  The 13 mm barium pill freely traversed the esophagus and into the stomach.  IMPRESSION: Small hiatal hernia without reflux.   --- End of Report ---     DAISY LOZA MD; Resident Radiologist This document has been electronically signed. BERNARDO GUTIERREZ MD; Attending Radiologist This document has been electronically signed. Jan 27 2023 12:40PM   ACC: [id number] EXAM: XR ESOPH SNGL CON STUDY ORDERED BY: MAURA MERAZ  PROCEDURE DATE: 01/27/2023    INTERPRETATION: CLINICAL INFORMATION: Dysphagia  TECHNIQUE: An esophagram was performed under fluoroscopy utilizing barium as the contrast agent and multiple spot fluoroscopic images were taken in the AP, oblique and lateral projections.  COMPARISON: No similar examinations were available for comparison.  FLUORO TIME: 1.7 minutes  FINDINGS: Preliminary  radiograph of the chest is unremarkable.  The patient swallowed barium without difficulty.  The esophagus demonstrates normal distensibility, contour and course. There is no abnormal extrinsic mass effect. Contrast passes freely into the stomach.  A small hiatal hernia is present but no gastroesophageal reflux was demonstrated during this examination.  The 13 mm barium pill freely traversed the esophagus and into the stomach.  IMPRESSION: Small hiatal hernia without reflux.   --- End of Report ---     DAISY LOZA MD; Resident Radiologist This document has been electronically signed. BERNARDO GUTIERREZ MD; Attending Radiologist This document has been electronically signed. Jan 27 2023 12:40PM  / 1 Jonny Jacobson         Report date: 8/23/2022     View Order   (Report matches study selected on Patient History pane)              EXAM: [id number] - CT CHEST - ORDERED BY: LISA LINN ANA   PROCEDURE DATE: 08/17/2022    INTERPRETATION: EXAMINATION: CT CHEST  CLINICAL INDICATION: Dyspnea.  TECHNIQUE: Noncontrast CT of the chest was obtained.  COMPARISON: None.  FINDINGS:  AIRWAYS AND LUNGS: The central tracheobronchial tree is patent. The lungs are clear.  MEDIASTINUM AND PLEURA: There are no enlarged mediastinal, hilar or axillary lymph nodes. The visualized portion of the thyroid gland is unremarkable. There is no pleural effusion. There is no pneumothorax.  HEART AND VESSELS: There is mild cardiomegaly. There are atherosclerotic calcifications of the aorta and coronary arteries. There is no pericardial effusion.  UPPER ABDOMEN: Images of the upper abdomen demonstrate no abnormality.  BONES AND SOFT TISSUES: There are mild degenerative changes of the spine. The soft tissues are unremarkable.  IMPRESSION: Clear lungs  --- End of Report ---       JONNY JACOBSON MD; Attending Radiologist This document has been electronically signed. Aug 23 2022 9:02PM

## 2023-08-09 NOTE — ASSESSMENT
[FreeTextEntry1] : D/C Ramipril Start Losartan 50.  Discussed with cardiology. Speech and swallow evaluation if no response.  Continue bronchodilator therapy to DuoNeb and budesonide twice a day and albuterol as needed. F/U 6 weeks to assess response to therapy. Discussed with patient and wife.   35 minutes spent in evaluation management and review of studies.

## 2023-09-28 ENCOUNTER — RX RENEWAL (OUTPATIENT)
Age: 78
End: 2023-09-28

## 2023-09-29 ENCOUNTER — APPOINTMENT (OUTPATIENT)
Dept: PULMONOLOGY | Facility: CLINIC | Age: 78
End: 2023-09-29
Payer: MEDICARE

## 2023-09-29 VITALS — HEART RATE: 80 BPM | SYSTOLIC BLOOD PRESSURE: 116 MMHG | OXYGEN SATURATION: 98 % | DIASTOLIC BLOOD PRESSURE: 61 MMHG

## 2023-09-29 DIAGNOSIS — Z23 ENCOUNTER FOR IMMUNIZATION: ICD-10-CM

## 2023-09-29 PROCEDURE — 90662 IIV NO PRSV INCREASED AG IM: CPT

## 2023-09-29 PROCEDURE — 99213 OFFICE O/P EST LOW 20 MIN: CPT | Mod: 25

## 2023-09-29 PROCEDURE — G0008: CPT

## 2023-09-29 RX ORDER — BUDESONIDE 0.5 MG/2ML
0.5 INHALANT ORAL
Qty: 360 | Refills: 3 | Status: ACTIVE | COMMUNITY
Start: 2023-05-30 | End: 1900-01-01

## 2023-12-04 ENCOUNTER — APPOINTMENT (OUTPATIENT)
Dept: PULMONOLOGY | Facility: CLINIC | Age: 78
End: 2023-12-04
Payer: MEDICARE

## 2023-12-04 VITALS — DIASTOLIC BLOOD PRESSURE: 73 MMHG | OXYGEN SATURATION: 97 % | HEART RATE: 78 BPM | SYSTOLIC BLOOD PRESSURE: 142 MMHG

## 2023-12-04 PROCEDURE — 99214 OFFICE O/P EST MOD 30 MIN: CPT | Mod: 25

## 2023-12-04 PROCEDURE — 94010 BREATHING CAPACITY TEST: CPT

## 2023-12-12 ENCOUNTER — APPOINTMENT (OUTPATIENT)
Dept: DERMATOLOGY | Facility: CLINIC | Age: 78
End: 2023-12-12
Payer: MEDICARE

## 2023-12-12 DIAGNOSIS — D22.9 MELANOCYTIC NEVI, UNSPECIFIED: ICD-10-CM

## 2023-12-12 DIAGNOSIS — L28.0 LICHEN SIMPLEX CHRONICUS: ICD-10-CM

## 2023-12-12 DIAGNOSIS — L29.9 PRURITUS, UNSPECIFIED: ICD-10-CM

## 2023-12-12 PROCEDURE — 99204 OFFICE O/P NEW MOD 45 MIN: CPT

## 2023-12-12 RX ORDER — TRIAMCINOLONE ACETONIDE 1 MG/G
0.1 OINTMENT TOPICAL
Qty: 1 | Refills: 1 | Status: ACTIVE | COMMUNITY
Start: 2023-12-12 | End: 1900-01-01

## 2023-12-19 ENCOUNTER — OUTPATIENT (OUTPATIENT)
Dept: OUTPATIENT SERVICES | Facility: HOSPITAL | Age: 78
LOS: 1 days | End: 2023-12-19
Payer: MEDICARE

## 2023-12-19 ENCOUNTER — APPOINTMENT (OUTPATIENT)
Dept: CT IMAGING | Facility: IMAGING CENTER | Age: 78
End: 2023-12-19
Payer: MEDICARE

## 2023-12-19 DIAGNOSIS — J44.9 CHRONIC OBSTRUCTIVE PULMONARY DISEASE, UNSPECIFIED: ICD-10-CM

## 2023-12-19 DIAGNOSIS — Z98.890 OTHER SPECIFIED POSTPROCEDURAL STATES: Chronic | ICD-10-CM

## 2023-12-19 PROCEDURE — 71250 CT THORAX DX C-: CPT | Mod: 26,MH

## 2023-12-19 PROCEDURE — 71250 CT THORAX DX C-: CPT

## 2023-12-30 ENCOUNTER — NON-APPOINTMENT (OUTPATIENT)
Age: 78
End: 2023-12-30

## 2024-02-19 NOTE — PATIENT PROFILE ADULT. - TOBACCO USE
Patient is a 27 y/o F  s/p  2023 w/Pmhx HTN who presented to [facility] ED with nausea vomiting, diarrhea, menorrhagia and RLQ pain. She was found to have a WBC 38123. Pelvic ultrasound revealed bilateral ovarian cysts with complex fluid noted in the posterior and anterior cul-de-sacs. She was started on gentamicin, clindamycin, and ampicillin. Pt later endorsed lightheadedness and dizziness. Pt complained of a feeling of intense fear, palpitations, numbness & tingling in the hands. Pt has only endorsed this feeling once and states that she feared dying alone. Prior to her admission, she experience a separation between her significant other and lost her home. She is concerned because she does not have a stable home for her family, which includes her three children. She was very tearful on examination but denies any thoughts of self-harm. Additionally, she was experience elevated BP and has not received her home medications.     Family Medicine service has been consulted for the medical management of her pre-syncopal symptoms and elevated BP. Will evaluated and manage symptoms and conditions described above.       Former smoker

## 2024-03-11 ENCOUNTER — APPOINTMENT (OUTPATIENT)
Dept: PULMONOLOGY | Facility: CLINIC | Age: 79
End: 2024-03-11
Payer: MEDICARE

## 2024-03-11 VITALS — OXYGEN SATURATION: 99 % | SYSTOLIC BLOOD PRESSURE: 137 MMHG | DIASTOLIC BLOOD PRESSURE: 63 MMHG | HEART RATE: 80 BPM

## 2024-03-11 DIAGNOSIS — J44.9 CHRONIC OBSTRUCTIVE PULMONARY DISEASE, UNSPECIFIED: ICD-10-CM

## 2024-03-11 DIAGNOSIS — R05.9 COUGH, UNSPECIFIED: ICD-10-CM

## 2024-03-11 LAB — POCT - HEMOGLOBIN (HGB), QUANTITATIVE, TRANSCUTANEOUS: 11.4

## 2024-03-11 PROCEDURE — 88738 HGB QUANT TRANSCUTANEOUS: CPT

## 2024-03-11 PROCEDURE — 99213 OFFICE O/P EST LOW 20 MIN: CPT | Mod: 25

## 2024-03-11 PROCEDURE — 94729 DIFFUSING CAPACITY: CPT

## 2024-03-11 PROCEDURE — ZZZZZ: CPT

## 2024-03-11 PROCEDURE — 94010 BREATHING CAPACITY TEST: CPT

## 2024-03-11 PROCEDURE — 94727 GAS DIL/WSHOT DETER LNG VOL: CPT

## 2024-03-11 NOTE — ASSESSMENT
[FreeTextEntry1] : GI F/U Continue bronchodilator therapy DuoNeb and budesonide twice a day and albuterol as needed. no indication for f/u CT unless change in status F/U 3 months sooner PRN Discussed with patient and wife.

## 2024-03-11 NOTE — CONSULT LETTER
[Consult Letter:] : I had the pleasure of evaluating your patient, [unfilled]. [Dear  ___] : Dear ~CARLITA, [Consult Closing:] : Thank you very much for allowing me to participate in the care of this patient.  If you have any questions, please do not hesitate to contact me. [Sincerely,] : Sincerely, [FreeTextEntry2] : Sherif Jacobson MD\par   [FreeTextEntry3] : [facility]

## 2024-03-11 NOTE — HISTORY OF PRESENT ILLNESS
[Former] : former [TextBox_4] : has been doing the DuoNeb and budesonide via nebulizer  cough has gotten significantly better but has not resolved. Predominantly dry cough. No significant nocturnal. had endoscopy and believes is on PPI. To F/U Dr Tran.  SOB improved. Occ. wheeze.  never went for swallow study. got rsv vaccine Seeing cardiology had .   [TextBox_13] : 38 [TextBox_11] : 1 [YearQuit] : 2008

## 2024-03-11 NOTE — DISCUSSION/SUMMARY
[FreeTextEntry1] : Component of chronic obstructive pulmonary disease with component of chronic bronchitis.  Clinically improved.  Functionally relatively stable. Rule out swallowing disorder and aspiration.  Did not have swallowing study reviewed chart. Cannot exclude component of cough related to GERD but believes on treatment. Coronary calcifications.  Seeing cardiology.

## 2024-03-11 NOTE — PROCEDURE
[FreeTextEntry1] : 03/11/2024 Pulmonary function testing There is a moderate ventilatory impairment in a combined obstructive and restrictive pattern. There is evidence of mild hyperinflation. There is elevation in the RV/TLC ratio indicative of possible air trapping. Normal Lung Volumes. Diffusion capacity is normal.  There is a mild decrease in flow rates compared to August 2023 and mild increase in diffusion capacity.    1 / 1 Bernardo Gutierrez  Report date:1/27/2023 View Order (Report matches exam selected in Patient History pane)     ACC: [id number] EXAM: XR ESOPH SNGL CON STUDY ORDERED BY: MAURA MERAZ  PROCEDURE DATE: 01/27/2023    INTERPRETATION: CLINICAL INFORMATION: Dysphagia  TECHNIQUE: An esophagram was performed under fluoroscopy utilizing barium as the contrast agent and multiple spot fluoroscopic images were taken in the AP, oblique and lateral projections.  COMPARISON: No similar examinations were available for comparison.  FLUORO TIME: 1.7 minutes  FINDINGS: Preliminary  radiograph of the chest is unremarkable.  The patient swallowed barium without difficulty.  The esophagus demonstrates normal distensibility, contour and course. There is no abnormal extrinsic mass effect. Contrast passes freely into the stomach.  A small hiatal hernia is present but no gastroesophageal reflux was demonstrated during this examination.  The 13 mm barium pill freely traversed the esophagus and into the stomach.  IMPRESSION: Small hiatal hernia without reflux.   --- End of Report ---     DAISY LOZA MD; Resident Radiologist This document has been electronically signed. BERNARDO GUTIERREZ MD; Attending Radiologist This document has been electronically signed. Jan 27 2023 12:40PM   ACC: [id number] EXAM: XR ESOPH SNGL CON STUDY ORDERED BY: MAURA MERAZ  PROCEDURE DATE: 01/27/2023    INTERPRETATION: CLINICAL INFORMATION: Dysphagia  TECHNIQUE: An esophagram was performed under fluoroscopy utilizing barium as the contrast agent and multiple spot fluoroscopic images were taken in the AP, oblique and lateral projections.  COMPARISON: No similar examinations were available for comparison.  FLUORO TIME: 1.7 minutes  FINDINGS: Preliminary  radiograph of the chest is unremarkable.  The patient swallowed barium without difficulty.  The esophagus demonstrates normal distensibility, contour and course. There is no abnormal extrinsic mass effect. Contrast passes freely into the stomach.  A small hiatal hernia is present but no gastroesophageal reflux was demonstrated during this examination.  The 13 mm barium pill freely traversed the esophagus and into the stomach.  IMPRESSION: Small hiatal hernia without reflux.   --- End of Report ---     DAISY LOZA MD; Resident Radiologist This document has been electronically signed. BERNARDO GUTIERREZ MD; Attending Radiologist This document has been electronically signed. Jan 27 2023 12:40PM  / 1 Jonny Jacobson         Report date: 8/23/2022     View Order   (Report matches study selected on Patient History pane)              EXAM: [id number] - CT CHEST - ORDERED BY: LISA JACOBSON   PROCEDURE DATE: 08/17/2022    INTERPRETATION: EXAMINATION: CT CHEST  CLINICAL INDICATION: Dyspnea.  TECHNIQUE: Noncontrast CT of the chest was obtained.  COMPARISON: None.  FINDINGS:  AIRWAYS AND LUNGS: The central tracheobronchial tree is patent. The lungs are clear.  MEDIASTINUM AND PLEURA: There are no enlarged mediastinal, hilar or axillary lymph nodes. The visualized portion of the thyroid gland is unremarkable. There is no pleural effusion. There is no pneumothorax.  HEART AND VESSELS: There is mild cardiomegaly. There are atherosclerotic calcifications of the aorta and coronary arteries. There is no pericardial effusion.  UPPER ABDOMEN: Images of the upper abdomen demonstrate no abnormality.  BONES AND SOFT TISSUES: There are mild degenerative changes of the spine. The soft tissues are unremarkable.  IMPRESSION: Clear lungs  --- End of Report ---       JONNY JACOBSON MD; Attending Radiologist This document has been electronically signed. Aug 23 2022 9:02PM

## 2024-03-11 NOTE — PHYSICAL EXAM
[No Acute Distress] : no acute distress [Normal Oropharynx] : normal oropharynx [Normal Appearance] : normal appearance [Normal Rate/Rhythm] : normal rate/rhythm [No Neck Mass] : no neck mass [Normal S1, S2] : normal s1, s2 [No Resp Distress] : no resp distress [No Murmurs] : no murmurs [Clear to Auscultation Bilaterally] : clear to auscultation bilaterally [Normal to Percussion] : normal to percussion [No Abnormalities] : no abnormalities [Benign] : benign [Normal Gait] : normal gait [No Clubbing] : no clubbing [No Cyanosis] : no cyanosis [No Edema] : no edema [FROM] : FROM [Normal Color/ Pigmentation] : normal color/ pigmentation [No Focal Deficits] : no focal deficits [Oriented x3] : oriented x3 [Normal Affect] : normal affect [TextBox_68] : No active wheezing.

## 2024-03-26 ENCOUNTER — APPOINTMENT (OUTPATIENT)
Dept: RADIOLOGY | Facility: HOSPITAL | Age: 79
End: 2024-03-26
Payer: MEDICARE

## 2024-03-26 ENCOUNTER — OUTPATIENT (OUTPATIENT)
Dept: OUTPATIENT SERVICES | Facility: HOSPITAL | Age: 79
LOS: 1 days | End: 2024-03-26

## 2024-03-26 ENCOUNTER — APPOINTMENT (OUTPATIENT)
Dept: SPEECH THERAPY | Facility: HOSPITAL | Age: 79
End: 2024-03-26
Payer: MEDICARE

## 2024-03-26 ENCOUNTER — OUTPATIENT (OUTPATIENT)
Dept: OUTPATIENT SERVICES | Facility: HOSPITAL | Age: 79
LOS: 1 days | Discharge: HOME | End: 2024-03-26

## 2024-03-26 DIAGNOSIS — Z98.890 OTHER SPECIFIED POSTPROCEDURAL STATES: Chronic | ICD-10-CM

## 2024-03-26 DIAGNOSIS — R05.9 COUGH, UNSPECIFIED: ICD-10-CM

## 2024-03-26 PROCEDURE — 74230 X-RAY XM SWLNG FUNCJ C+: CPT | Mod: 26

## 2024-03-26 NOTE — HISTORY OF PRESENT ILLNESS
[FreeTextEntry1] : Patient arrived to Radiology for an OutPatient Modified Barium Swallow Study. Patient was accompanied by his wife. Patient ambulates with a cane. Patient is an 80 y/o male with PMH as per EMR.   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) Anemia (285.9) (D64.9) Back pain, chronic (724.5,338.29) (M54.9,G89.29) Bilateral hip pain (719.45) (M25.551,M25.552) Cervical spinal stenosis (723.0) (M48.02) Cervical stenosis of spine (723.0) (M48.02) COPD (chronic obstructive pulmonary disease) (496) (J44.9) Cough (786.2) (R05.9) Dental infection (522.4) (K04.7) Diabetes mellitus (250.00) (E11.9) Encounter for immunization (V03.89) (Z23) Generalized pruritus (698.9) (L29.9) Hiccups (786.8) (R06.6) Hyperlipidemia (272.4) (E78.5) Hypertension (401.9) (I10) Imbalance (781.2) (R26.89) Lichen simplex chronicus (698.3) (L28.0) Lumbar canal stenosis (724.02) (M48.061) Lumbar radiculopathy (724.4) (M54.16) Lumbosacral stenosis (724.02) (M48.07) Monoclonal gammopathy of undetermined significance (273.1) (D47.2) S/P lumbar laminectomy (V45.89) (Z98.890) Spinal stenosis (724.00) (M48.00) Thoracic spinal stenosis (724.01) (M48.04)      Past Medical History Back pain, chronic (724.5,338.29) (M54.9,G89.29) History of Benign prostatic hyperplasia with lower urinary tract symptoms, symptom details unspecified (600.01) (N40.1) COPD (chronic obstructive pulmonary disease) (496) (J44.9) Diabetes mellitus (250.00) (E11.9) History of thyroid disease (V12.29) (Z86.39) Hyperlipidemia (272.4) (E78.5) Hypertension (401.9) (I10)   Pulmonary Note 3/11/2024 - Component of chronic obstructive pulmonary disease with component of chronic bronchitis. Clinically improved. Functionally relatively stable. Rule out swallowing disorder and aspiration. Did not have swallowing study reviewed chart. Cannot exclude component of cough related to GERD but believes on treatment. Coronary calcifications. Seeing cardiology.    Today, Patient offers c/o "too much coughing when I eat and drink" greater than 1 year and "it gives me sore throat from the coughing"   No current/repeated pneumonia. Patient eats Regular with Thin Liquids.  This Modified Barium Swallow Study is to objectively assess the Physiology of the Oral and Pharyngeal Stage swallowing mechanism for treatment plan for least restrictive diet.   Referring MD: Dr. Abel Valle

## 2024-03-26 NOTE — ASSESSMENT
[FreeTextEntry1] : Patient arrived to Radiology for an OutPatient Modified Barium Swallow Study. Patient was accompanied by his wife. Patient ambulates with a cane. Patient is an 80 y/o male with PMH as per EMR.  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) Anemia (285.9) (D64.9) Back pain, chronic (724.5,338.29) (M54.9,G89.29) Bilateral hip pain (719.45) (M25.551,M25.552) Cervical spinal stenosis (723.0) (M48.02) Cervical stenosis of spine (723.0) (M48.02) COPD (chronic obstructive pulmonary disease) (496) (J44.9) Cough (786.2) (R05.9) Dental infection (522.4) (K04.7) Diabetes mellitus (250.00) (E11.9) Encounter for immunization (V03.89) (Z23) Generalized pruritus (698.9) (L29.9) Hiccups (786.8) (R06.6) Hyperlipidemia (272.4) (E78.5) Hypertension (401.9) (I10) Imbalance (781.2) (R26.89) Lichen simplex chronicus (698.3) (L28.0) Lumbar canal stenosis (724.02) (M48.061) Lumbar radiculopathy (724.4) (M54.16) Lumbosacral stenosis (724.02) (M48.07) Monoclonal gammopathy of undetermined significance (273.1) (D47.2) S/P lumbar laminectomy (V45.89) (Z98.890) Spinal stenosis (724.00) (M48.00) Thoracic spinal stenosis (724.01) (M48.04)   Past Medical History Back pain, chronic (724.5,338.29) (M54.9,G89.29) History of Benign prostatic hyperplasia with lower urinary tract symptoms, symptom details unspecified (600.01) (N40.1) COPD (chronic obstructive pulmonary disease) (496) (J44.9) Diabetes mellitus (250.00) (E11.9) History of thyroid disease (V12.29) (Z86.39) Hyperlipidemia (272.4) (E78.5) Hypertension (401.9) (I10)   Pulmonary Note 3/11/2024 - Component of chronic obstructive pulmonary disease with component of chronic bronchitis. Clinically improved. Functionally relatively stable. Rule out swallowing disorder and aspiration. Did not have swallowing study reviewed chart. Cannot exclude component of cough related to GERD but believes on treatment. Coronary calcifications. Seeing cardiology.    Today, Patient offers c/o "too much coughing when I eat and drink" greater than 1 year and "it gives me sore throat from the coughing" No current/repeated pneumonia. Patient eats Regular with Thin Liquids. This Modified Barium Swallow Study is to objectively assess the Physiology of the Oral and Pharyngeal Stage swallowing mechanism for treatment plan for least restrictive diet.  Referring MD: Dr. Abel Valle      Modified Barium Swallow Study: Current nutritional intake: Regular with Thin liquids Positioning: seated upright in chair with lateral view projection and standing with anterior posterior view projection Consistencies Administered: Thin liquids via cup sip self administered (single/consecutive) puree, solid and a 13mml. Delvalle's Barium Tablet  Rosenbek's penetration aspiration scale: 1=no aspiration, contrast does not enter airway - Puree, Solids, Thin Liquids      "Aspiration - Penetration Scale (Rosenbek et al Dysphagia 11:93-98 (April 1996), Aspiration-Penetration Scale)"   Assessment Clinical Narrative/Summary:  Patient presents with Functional Oral and Pharyngeal stage swallowing mechanism. The Oral Stage is characterized by adequate oral containment, adequate chewing for solid, adequate bolus manipulation, adequate tongue motion with adequate anterior to posterior transfer of the bolus for puree/solids; with adequate oral clearance. The Pharyngeal Stage is characterized by adequate initiation of the pharyngeal swallow, adequate laryngeal elevation, adequate tongue base retraction, adequate pharyngeal constriction. There is adequate pharyngeal clearance post swallow for puree/solids. There was No Aspiration observed before, during or after the swallow for puree, solids, thin liquids.  RESULTS: No Aspiration observed before, during or after the swallow for puree, solids, thin liquids.  Of Note: Patient was given a Puree, Thin Liquids and Barium Tablet in Anterior Posterior view An Esophageal Screen was performed. Patient was given a Barium Tablet with a cup of water. The Tablet was observed to course through the pharynx/esophagus without hold up. This cannot be considered as a full complete evaluation of the esophagus.    Plan: Recommendations/Plan:. 1.) Continue with current diet regimen of Regular and Thin Liquids 2. Aspiration Precautions 3.) Reflux Precautions 4.) Maintain Good Oral Hygiene Care 5.) Follow up with Physician  SLP provided Patient education regarding preliminary results. Patient verbalized understanding and will follow up with Physician.  No

## 2024-03-26 NOTE — PLAN
[FreeTextEntry2] :  1.) Continue with current diet regimen of Regular and Thin Liquids 2. Aspiration Precautions 3.) Reflux Precautions 4.) Maintain Good Oral Hygiene Care 5.) Follow up with Physician  SLP provided Patient education regarding preliminary results. Patient verbalized understanding and will follow up with Physician.

## 2024-03-26 NOTE — ASSESSMENT
[FreeTextEntry1] : Patient arrived to Radiology for an OutPatient Modified Barium Swallow Study. Patient was accompanied by his wife. Patient ambulates with a cane. Patient is an 80 y/o male with PMH as per EMR.  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) Anemia (285.9) (D64.9) Back pain, chronic (724.5,338.29) (M54.9,G89.29) Bilateral hip pain (719.45) (M25.551,M25.552) Cervical spinal stenosis (723.0) (M48.02) Cervical stenosis of spine (723.0) (M48.02) COPD (chronic obstructive pulmonary disease) (496) (J44.9) Cough (786.2) (R05.9) Dental infection (522.4) (K04.7) Diabetes mellitus (250.00) (E11.9) Encounter for immunization (V03.89) (Z23) Generalized pruritus (698.9) (L29.9) Hiccups (786.8) (R06.6) Hyperlipidemia (272.4) (E78.5) Hypertension (401.9) (I10) Imbalance (781.2) (R26.89) Lichen simplex chronicus (698.3) (L28.0) Lumbar canal stenosis (724.02) (M48.061) Lumbar radiculopathy (724.4) (M54.16) Lumbosacral stenosis (724.02) (M48.07) Monoclonal gammopathy of undetermined significance (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.1) (D47.2) S/P lumbar laminectomy (V45.89) (Z98.890) Spinal stenosis (724.00) (M48.00) Thoracic spinal stenosis (724.01) (M48.04)   Past Medical History Back pain, chronic (724.5,338.29) (M54.9,G89.29) History of Benign prostatic hyperplasia with lower urinary tract symptoms, symptom details unspecified (600.01) (N40.1) COPD (chronic obstructive pulmonary disease) (496) (J44.9) Diabetes mellitus (250.00) (E11.9) History of thyroid disease (V12.29) (Z86.39) Hyperlipidemia (272.4) (E78.5) Hypertension (401.9) (I10)   Pulmonary Note 3/11/2024 - Component of chronic obstructive pulmonary disease with component of chronic bronchitis. Clinically improved. Functionally relatively stable. Rule out swallowing disorder and aspiration. Did not have swallowing study reviewed chart. Cannot exclude component of cough related to GERD but believes on treatment. Coronary calcifications. Seeing cardiology.    Today, Patient offers c/o "too much coughing when I eat and drink" greater than 1 year and "it gives me sore throat from the coughing" No current/repeated pneumonia. Patient eats Regular with Thin Liquids. This Modified Barium Swallow Study is to objectively assess the Physiology of the Oral and Pharyngeal Stage swallowing mechanism for treatment plan for least restrictive diet.  Referring MD: Dr. Abel Valle      Modified Barium Swallow Study: Current nutritional intake: Regular with Thin liquids Positioning: seated upright in chair with lateral view projection and standing with anterior posterior view projection Consistencies Administered: Thin liquids via cup sip self administered (single/consecutive) puree, solid and a 13mml. Delvalle's Barium Tablet  Rosenbek's penetration aspiration scale: 1=no aspiration, contrast does not enter airway - Puree, Solids, Thin Liquids      "Aspiration - Penetration Scale (Rosenbek et al Dysphagia 11:93-98 (April 1996), Aspiration-Penetration Scale)"   Assessment Clinical Narrative/Summary:  Patient presents with Functional Oral and Pharyngeal stage swallowing mechanism. The Oral Stage is characterized by adequate oral containment, adequate chewing for solid, adequate bolus manipulation, adequate tongue motion with adequate anterior to posterior transfer of the bolus for puree/solids; with adequate oral clearance. The Pharyngeal Stage is characterized by adequate initiation of the pharyngeal swallow, adequate laryngeal elevation, adequate tongue base retraction, adequate pharyngeal constriction. There is adequate pharyngeal clearance post swallow for puree/solids. There was No Aspiration observed before, during or after the swallow for puree, solids, thin liquids.  RESULTS: No Aspiration observed before, during or after the swallow for puree, solids, thin liquids.  Of Note: Patient was given a Puree, Thin Liquids and Barium Tablet in Anterior Posterior view An Esophageal Screen was performed. Patient was given a Barium Tablet with a cup of water. The Tablet was observed to course through the pharynx/esophagus without hold up. This cannot be considered as a full complete evaluation of the esophagus.    Plan: Recommendations/Plan:. 1.) Continue with current diet regimen of Regular and Thin Liquids 2. Aspiration Precautions 3.) Reflux Precautions 4.) Maintain Good Oral Hygiene Care 5.) Follow up with Physician  SLP provided Patient education regarding preliminary results. Patient verbalized understanding and will follow up with Physician.

## 2024-03-26 NOTE — HISTORY OF PRESENT ILLNESS
[FreeTextEntry1] : Patient arrived to Radiology for an OutPatient Modified Barium Swallow Study. Patient was accompanied by his wife. Patient ambulates with a cane. Patient is an 78 y/o male with PMH as per EMR.   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) Anemia (285.9) (D64.9) Back pain, chronic (724.5,338.29) (M54.9,G89.29) Bilateral hip pain (719.45) (M25.551,M25.552) Cervical spinal stenosis (723.0) (M48.02) Cervical stenosis of spine (723.0) (M48.02) COPD (chronic obstructive pulmonary disease) (496) (J44.9) Cough (786.2) (R05.9) Dental infection (522.4) (K04.7) Diabetes mellitus (250.00) (E11.9) Encounter for immunization (V03.89) (Z23) Generalized pruritus (698.9) (L29.9) Hiccups (786.8) (R06.6) Hyperlipidemia (272.4) (E78.5) Hypertension (401.9) (I10) Imbalance (781.2) (R26.89) Lichen simplex chronicus (698.3) (L28.0) Lumbar canal stenosis (724.02) (M48.061) Lumbar radiculopathy (724.4) (M54.16) Lumbosacral stenosis (724.02) (M48.07) Monoclonal gammopathy of undetermined significance (273.1) (D47.2) S/P lumbar laminectomy (V45.89) (Z98.890) Spinal stenosis (724.00) (M48.00) Thoracic spinal stenosis (724.01) (M48.04)      Past Medical History Back pain, chronic (724.5,338.29) (M54.9,G89.29) History of Benign prostatic hyperplasia with lower urinary tract symptoms, symptom details unspecified (600.01) (N40.1) COPD (chronic obstructive pulmonary disease) (496) (J44.9) Diabetes mellitus (250.00) (E11.9) History of thyroid disease (V12.29) (Z86.39) Hyperlipidemia (272.4) (E78.5) Hypertension (401.9) (I10)   Pulmonary Note 3/11/2024 - Component of chronic obstructive pulmonary disease with component of chronic bronchitis. Clinically improved. Functionally relatively stable. Rule out swallowing disorder and aspiration. Did not have swallowing study reviewed chart. Cannot exclude component of cough related to GERD but believes on treatment. Coronary calcifications. Seeing cardiology.    Today, Patient offers c/o "too much coughing when I eat and drink" greater than 1 year and "it gives me sore throat from the coughing"   No current/repeated pneumonia. Patient eats Regular with Thin Liquids.  This Modified Barium Swallow Study is to objectively assess the Physiology of the Oral and Pharyngeal Stage swallowing mechanism for treatment plan for least restrictive diet.   Referring MD: Dr. Abel Valle

## 2024-04-02 DIAGNOSIS — R13.10 DYSPHAGIA, UNSPECIFIED: ICD-10-CM

## 2024-06-15 NOTE — PRE-OP CHECKLIST - SURGICAL CONSENT
Lives at [facility] has been increasingly confused over the past 2-3 days.  Today refused to take medications today and refused giving a urine sample.  [place] called family to bring patient here.    Alert to self, confused to time/situation in triage.      Triage Assessment (Adult)       Row Name 06/15/24 1649          Triage Assessment    Airway WDL WDL        Respiratory WDL    Respiratory WDL WDL        Skin Circulation/Temperature WDL    Skin Circulation/Temperature WDL WDL        Cardiac WDL    Cardiac WDL WDL        Peripheral/Neurovascular WDL    Peripheral Neurovascular WDL WDL        Cognitive/Neuro/Behavioral WDL    Cognitive/Neuro/Behavioral WDL X                      done

## 2024-07-15 ENCOUNTER — APPOINTMENT (OUTPATIENT)
Dept: PULMONOLOGY | Facility: CLINIC | Age: 79
End: 2024-07-15
Payer: MEDICARE

## 2024-07-15 VITALS
WEIGHT: 125 LBS | HEART RATE: 77 BPM | DIASTOLIC BLOOD PRESSURE: 66 MMHG | OXYGEN SATURATION: 97 % | SYSTOLIC BLOOD PRESSURE: 120 MMHG | BODY MASS INDEX: 21.46 KG/M2

## 2024-07-15 DIAGNOSIS — J44.9 CHRONIC OBSTRUCTIVE PULMONARY DISEASE, UNSPECIFIED: ICD-10-CM

## 2024-07-15 PROCEDURE — 99213 OFFICE O/P EST LOW 20 MIN: CPT | Mod: 25

## 2024-07-15 PROCEDURE — 94010 BREATHING CAPACITY TEST: CPT

## 2024-07-15 RX ORDER — PANTOPRAZOLE 40 MG/1
40 TABLET, DELAYED RELEASE ORAL
Refills: 0 | Status: ACTIVE | COMMUNITY
Start: 2024-07-15

## 2024-08-02 ENCOUNTER — RX RENEWAL (OUTPATIENT)
Age: 79
End: 2024-08-02

## 2024-10-16 ENCOUNTER — RX RENEWAL (OUTPATIENT)
Age: 79
End: 2024-10-16

## 2024-10-24 NOTE — H&P PST ADULT - DOCUMENT STATUS
Performing Laboratory: [id number]
Billing Type: Third-Party Bill
Bill For Surgical Tray: no
Expected Date Of Service: 10/24/2024
Authored by Resident/PA/NP

## 2024-11-18 ENCOUNTER — APPOINTMENT (OUTPATIENT)
Dept: PULMONOLOGY | Facility: CLINIC | Age: 79
End: 2024-11-18
Payer: MEDICARE

## 2024-11-18 VITALS — OXYGEN SATURATION: 95 % | HEART RATE: 85 BPM | DIASTOLIC BLOOD PRESSURE: 67 MMHG | SYSTOLIC BLOOD PRESSURE: 137 MMHG

## 2024-11-18 DIAGNOSIS — J44.9 CHRONIC OBSTRUCTIVE PULMONARY DISEASE, UNSPECIFIED: ICD-10-CM

## 2024-11-18 PROCEDURE — 99213 OFFICE O/P EST LOW 20 MIN: CPT | Mod: 25

## 2024-11-18 PROCEDURE — 94010 BREATHING CAPACITY TEST: CPT

## 2024-11-29 DIAGNOSIS — K21.00 GASTRO-ESOPHAGEAL REFLUX DISEASE WITH ESOPHAGITIS, WITHOUT BLEEDING: ICD-10-CM

## 2024-11-29 DIAGNOSIS — R19.7 DIARRHEA, UNSPECIFIED: ICD-10-CM

## 2024-11-29 RX ORDER — LOPERAMIDE HYDROCHLORIDE 2 MG/1
2 CAPSULE ORAL TWICE DAILY
Qty: 60 | Refills: 3 | Status: ACTIVE | COMMUNITY
Start: 2024-11-29 | End: 1900-01-01

## 2024-11-29 RX ORDER — FAMOTIDINE 40 MG/1
40 TABLET, FILM COATED ORAL TWICE DAILY
Qty: 180 | Refills: 3 | Status: ACTIVE | COMMUNITY
Start: 2024-11-29 | End: 1900-01-01

## 2025-02-21 ENCOUNTER — APPOINTMENT (OUTPATIENT)
Dept: PULMONOLOGY | Facility: CLINIC | Age: 80
End: 2025-02-21
Payer: MEDICARE

## 2025-02-21 ENCOUNTER — APPOINTMENT (OUTPATIENT)
Dept: PULMONOLOGY | Facility: CLINIC | Age: 80
End: 2025-02-21

## 2025-02-21 VITALS — SYSTOLIC BLOOD PRESSURE: 158 MMHG | DIASTOLIC BLOOD PRESSURE: 74 MMHG

## 2025-02-21 VITALS — OXYGEN SATURATION: 99 % | HEART RATE: 80 BPM | SYSTOLIC BLOOD PRESSURE: 157 MMHG | DIASTOLIC BLOOD PRESSURE: 68 MMHG

## 2025-02-21 DIAGNOSIS — J44.9 CHRONIC OBSTRUCTIVE PULMONARY DISEASE, UNSPECIFIED: ICD-10-CM

## 2025-02-21 PROCEDURE — 99214 OFFICE O/P EST MOD 30 MIN: CPT | Mod: 25

## 2025-02-21 PROCEDURE — 94010 BREATHING CAPACITY TEST: CPT

## 2025-03-26 RX ORDER — PREDNISONE 10 MG/1
10 TABLET ORAL
Qty: 30 | Refills: 0 | Status: ACTIVE | COMMUNITY
Start: 2025-03-26 | End: 1900-01-01

## 2025-04-03 DIAGNOSIS — J44.9 CHRONIC OBSTRUCTIVE PULMONARY DISEASE, UNSPECIFIED: ICD-10-CM

## 2025-04-03 RX ORDER — AZITHROMYCIN 250 MG/1
250 TABLET, FILM COATED ORAL
Qty: 1 | Refills: 0 | Status: ACTIVE | COMMUNITY
Start: 2025-04-03 | End: 1900-01-01

## 2025-07-21 ENCOUNTER — APPOINTMENT (OUTPATIENT)
Dept: PULMONOLOGY | Facility: CLINIC | Age: 80
End: 2025-07-21
Payer: MEDICARE

## 2025-07-21 VITALS
HEART RATE: 72 BPM | WEIGHT: 128 LBS | SYSTOLIC BLOOD PRESSURE: 122 MMHG | HEIGHT: 65 IN | OXYGEN SATURATION: 96 % | DIASTOLIC BLOOD PRESSURE: 61 MMHG | RESPIRATION RATE: 16 BRPM | BODY MASS INDEX: 21.33 KG/M2

## 2025-07-21 DIAGNOSIS — R05.9 COUGH, UNSPECIFIED: ICD-10-CM

## 2025-07-21 DIAGNOSIS — J44.9 CHRONIC OBSTRUCTIVE PULMONARY DISEASE, UNSPECIFIED: ICD-10-CM

## 2025-07-21 LAB — POCT - HEMOGLOBIN (HGB), QUANTITATIVE, TRANSCUTANEOUS: 9.9

## 2025-07-21 PROCEDURE — 94010 BREATHING CAPACITY TEST: CPT

## 2025-07-21 PROCEDURE — ZZZZZ: CPT

## 2025-07-21 PROCEDURE — 95012 NITRIC OXIDE EXP GAS DETER: CPT

## 2025-07-21 PROCEDURE — 99214 OFFICE O/P EST MOD 30 MIN: CPT | Mod: 25

## 2025-07-21 PROCEDURE — 88738 HGB QUANT TRANSCUTANEOUS: CPT

## 2025-07-21 PROCEDURE — 94729 DIFFUSING CAPACITY: CPT

## 2025-07-21 PROCEDURE — 94727 GAS DIL/WSHOT DETER LNG VOL: CPT

## 2025-07-21 RX ORDER — ALBUTEROL SULFATE 90 UG/1
108 (90 BASE) INHALANT RESPIRATORY (INHALATION)
Qty: 3 | Refills: 3 | Status: ACTIVE | COMMUNITY
Start: 2025-07-21 | End: 1900-01-01